# Patient Record
Sex: MALE | Race: WHITE | NOT HISPANIC OR LATINO | Employment: FULL TIME | URBAN - METROPOLITAN AREA
[De-identification: names, ages, dates, MRNs, and addresses within clinical notes are randomized per-mention and may not be internally consistent; named-entity substitution may affect disease eponyms.]

---

## 2019-05-27 ENCOUNTER — OFFICE VISIT (OUTPATIENT)
Dept: INTERNAL MEDICINE | Facility: CLINIC | Age: 44
End: 2019-05-27

## 2019-05-27 ENCOUNTER — CLINICAL SUPPORT (OUTPATIENT)
Dept: INTERNAL MEDICINE | Facility: CLINIC | Age: 44
End: 2019-05-27

## 2019-05-27 VITALS — SYSTOLIC BLOOD PRESSURE: 124 MMHG | HEART RATE: 80 BPM | DIASTOLIC BLOOD PRESSURE: 84 MMHG | WEIGHT: 183 LBS

## 2019-05-27 DIAGNOSIS — Z00.00 ROUTINE GENERAL MEDICAL EXAMINATION AT A HEALTH CARE FACILITY: Primary | ICD-10-CM

## 2019-05-27 DIAGNOSIS — Z00.00 ANNUAL PHYSICAL EXAM: Primary | ICD-10-CM

## 2019-05-27 LAB
ALBUMIN SERPL BCP-MCNC: 4.3 G/DL (ref 3.5–5.2)
ALP SERPL-CCNC: 64 U/L (ref 55–135)
ALT SERPL W/O P-5'-P-CCNC: 22 U/L (ref 10–44)
ANION GAP SERPL CALC-SCNC: 8 MMOL/L (ref 8–16)
AST SERPL-CCNC: 27 U/L (ref 10–40)
BILIRUB SERPL-MCNC: 1 MG/DL (ref 0.1–1)
BUN SERPL-MCNC: 14 MG/DL (ref 6–20)
CALCIUM SERPL-MCNC: 9.9 MG/DL (ref 8.7–10.5)
CHLORIDE SERPL-SCNC: 106 MMOL/L (ref 95–110)
CHOLEST SERPL-MCNC: 236 MG/DL (ref 120–199)
CHOLEST/HDLC SERPL: 3.2 {RATIO} (ref 2–5)
CO2 SERPL-SCNC: 25 MMOL/L (ref 23–29)
COMPLEXED PSA SERPL-MCNC: 0.41 NG/ML (ref 0–4)
CREAT SERPL-MCNC: 0.8 MG/DL (ref 0.5–1.4)
ERYTHROCYTE [DISTWIDTH] IN BLOOD BY AUTOMATED COUNT: 13 % (ref 11.5–14.5)
EST. GFR  (AFRICAN AMERICAN): >60 ML/MIN/1.73 M^2
EST. GFR  (NON AFRICAN AMERICAN): >60 ML/MIN/1.73 M^2
ESTIMATED AVG GLUCOSE: 103 MG/DL (ref 68–131)
GLUCOSE SERPL-MCNC: 95 MG/DL (ref 70–110)
HBA1C MFR BLD HPLC: 5.2 % (ref 4–5.6)
HCT VFR BLD AUTO: 44.8 % (ref 40–54)
HDLC SERPL-MCNC: 73 MG/DL (ref 40–75)
HDLC SERPL: 30.9 % (ref 20–50)
HGB BLD-MCNC: 14.9 G/DL (ref 14–18)
LDLC SERPL CALC-MCNC: 148.6 MG/DL (ref 63–159)
MCH RBC QN AUTO: 30.5 PG (ref 27–31)
MCHC RBC AUTO-ENTMCNC: 33.3 G/DL (ref 32–36)
MCV RBC AUTO: 92 FL (ref 82–98)
NONHDLC SERPL-MCNC: 163 MG/DL
PLATELET # BLD AUTO: 290 K/UL (ref 150–350)
PMV BLD AUTO: 9.8 FL (ref 9.2–12.9)
POTASSIUM SERPL-SCNC: 4.1 MMOL/L (ref 3.5–5.1)
PROT SERPL-MCNC: 7.4 G/DL (ref 6–8.4)
RBC # BLD AUTO: 4.89 M/UL (ref 4.6–6.2)
SODIUM SERPL-SCNC: 139 MMOL/L (ref 136–145)
TRIGL SERPL-MCNC: 72 MG/DL (ref 30–150)
TSH SERPL DL<=0.005 MIU/L-ACNC: 1.01 UIU/ML (ref 0.4–4)
WBC # BLD AUTO: 5.48 K/UL (ref 3.9–12.7)

## 2019-05-27 PROCEDURE — 85027 COMPLETE CBC AUTOMATED: CPT

## 2019-05-27 PROCEDURE — 99999 PR PBB SHADOW E&M-NEW PATIENT-LVL III: CPT | Mod: PBBFAC,,, | Performed by: INTERNAL MEDICINE

## 2019-05-27 PROCEDURE — 99203 OFFICE O/P NEW LOW 30 MIN: CPT | Mod: PBBFAC | Performed by: INTERNAL MEDICINE

## 2019-05-27 PROCEDURE — 99386 PR PREVENTIVE VISIT,NEW,40-64: ICD-10-PCS | Mod: S$PBB,,, | Performed by: INTERNAL MEDICINE

## 2019-05-27 PROCEDURE — 84153 ASSAY OF PSA TOTAL: CPT

## 2019-05-27 PROCEDURE — 84443 ASSAY THYROID STIM HORMONE: CPT

## 2019-05-27 PROCEDURE — 80061 LIPID PANEL: CPT

## 2019-05-27 PROCEDURE — 83036 HEMOGLOBIN GLYCOSYLATED A1C: CPT

## 2019-05-27 PROCEDURE — 99386 PREV VISIT NEW AGE 40-64: CPT | Mod: S$PBB,,, | Performed by: INTERNAL MEDICINE

## 2019-05-27 PROCEDURE — 80053 COMPREHEN METABOLIC PANEL: CPT

## 2019-05-27 PROCEDURE — 99999 PR PBB SHADOW E&M-NEW PATIENT-LVL III: ICD-10-PCS | Mod: PBBFAC,,, | Performed by: INTERNAL MEDICINE

## 2019-06-04 NOTE — PROGRESS NOTES
Subjective:       Patient ID: Riley Perez is a 44 y.o. male.    Chief Complaint: Executive Health    HPIDr Riley is a pleasant young gentleman originally from [place] here for his Executive Health exam. 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 He had no complaints stating that his health was good. He is active physically, exercises regularly, has maintained his weight and eats healthy for the most part.  I am sending copies of his blood work that showed mild increase in cholesterol level at 236 with excellent HDL fraction. All other parameters were within normal limits.    Review of Systems   All other systems reviewed and are negative.      Objective:      Physical Exam   Constitutional: He is oriented to person, place, and time. He appears well-developed and well-nourished. No distress.   HENT:   Head: Normocephalic and atraumatic.   Right Ear: External ear normal.   Left Ear: External ear normal.   Mouth/Throat: Oropharynx is clear and moist. No oropharyngeal exudate.   Eyes: Pupils are equal, round, and reactive to light. Conjunctivae and EOM are normal. Right eye exhibits no discharge. Left eye exhibits no discharge. No scleral icterus.   Neck: Normal range of motion. Neck supple. No JVD present. No thyromegaly present.   Cardiovascular: Normal rate, regular rhythm, normal heart sounds and intact distal pulses.   No murmur heard.  Pulmonary/Chest: Effort normal and breath sounds normal. No respiratory distress. He has no wheezes. He exhibits no tenderness.   Abdominal: Soft. Bowel sounds are normal. He exhibits no distension and no mass. There is no tenderness.   Musculoskeletal: Normal range of motion. He exhibits no edema or tenderness.   Lymphadenopathy:     He has no cervical adenopathy.   Neurological: He is alert and oriented to person, place, and time. No cranial nerve deficit. Coordination normal.   Skin: Skin is warm and dry. No rash noted. He is not  diaphoretic. No erythema.   Psychiatric: He has a normal mood and affect. His behavior is normal. Judgment and thought content normal.   Vitals reviewed.      Assessment:    1. Executive Health exam.  Plan:    1. Return to clinic in 1 year or sooner if needed.

## 2019-06-19 ENCOUNTER — OFFICE VISIT (OUTPATIENT)
Dept: OPTOMETRY | Facility: CLINIC | Age: 44
End: 2019-06-19

## 2019-06-19 DIAGNOSIS — H52.13 MYOPIA OF BOTH EYES: Primary | ICD-10-CM

## 2019-06-19 PROCEDURE — 99999 PR PBB SHADOW E&M-EST. PATIENT-LVL II: ICD-10-PCS | Mod: PBBFAC,,, | Performed by: OPTOMETRIST

## 2019-06-19 PROCEDURE — 92310 PR CONTACT LENS FITTING (NO CHANGE): ICD-10-PCS | Mod: ,,, | Performed by: OPTOMETRIST

## 2019-06-19 PROCEDURE — 99999 PR PBB SHADOW E&M-EST. PATIENT-LVL II: CPT | Mod: PBBFAC,,, | Performed by: OPTOMETRIST

## 2019-06-19 PROCEDURE — 99212 OFFICE O/P EST SF 10 MIN: CPT | Mod: PBBFAC | Performed by: OPTOMETRIST

## 2019-06-19 PROCEDURE — 92310 CONTACT LENS FITTING OU: CPT | Mod: ,,, | Performed by: OPTOMETRIST

## 2019-06-19 NOTE — PATIENT INSTRUCTIONS
Adult Vision:   41 to 60 Years of Age    If you are over 40 years of age, you've probably noticed changes in your vision. Difficulty seeing clearly for reading and close work is among the most common problems adults develop between ages 41 to 60. However, this is also the time when other changes in your eyes can start to affect your work and enjoyment of life.   Beginning in the early to mid-forties, most adults may start to experience problems with their ability to see clearly at close distances, especially for reading and computer tasks. This normal aging change in the eye's focusing ability, called presbyopia, will continue to progress over time.      Many people in middle age begin to experience difficulty with their vision.   Initially, you may find you need to hold reading materials farther away to see them clearly. Print in the newspaper or on a restaurant menu may appear blurred, especially under dim lighting. If you already wear prescription glasses or contact lenses to see clearly in the distance, the near vision changes caused by presbyopia can bring about the need to use bifocal or multifocal lenses. If you are nearsighted, you may have discovered that you now need to remove you glasses to see better up close. Fortunately, people with presbyopia now have many options to improve their ability to see well.   Along with the onset of presbyopia, an increase in the incidence of eye health problems occurs during these years. Whether or not there is a need for eyeglasses, adults should be examined for signs of developing eye and vision problems. A comprehensive eye examination is recommended at least every two years. Don't rely on an insufficient substitute like the limited 's license vision test or other vision screenings to determine if you have an eye or vision problem.  Adults over 40 may be particularly at risk for the development of eye and vision problems if any of the following exist:  Chronic,  systemic conditions such as diabetes or high blood pressure.   A family history of glaucoma or macular degeneration.   A highly visually demanding job or work in an eye-hazardous occupation.   Health conditions like high cholesterol, thyroid conditions, anxiety or depression, and arthritis for which you take medications. Many medications, even antihistamines, have ocular side-effects.    Understanding Age-related Vision Changes  Just like your body, your eyes and vision change over time. Aging changes in various parts of the eye can result in a number of noticeable differences in how well you see. While not everyone will experience the same level of symptoms, the following are common age-related vision changes:  Need for More Light  As you age, you need more light to see as well as you did in years past. Brighter lights in your work area or next to your reading chair will help make reading and other near tasks easier.    Difficulty Reading and Doing Close Work  Printed materials are not as clear as before, in part because the lens in your eye becomes less flexible with time. This makes it harder for your eyes to focus near objects with the same ability you had when you were younger.    Problems with Glare  You may notice additional glare from headlights at night or sun reflecting off of windshields or pavement during the day, making it more difficult to drive. Changes within the lens in your eye cause light entering the eye to be scattered rather than focused precisely on the retina, thus creating more glare.    Changes in Color Perception  The normally clear lens located inside your eye may start to discolor making it harder to see and distinguish between certain shades of colors.    Reduced Tear Production  With age, the tear glands in your eyes will produce fewer tears. This is particularly true for women after menopause. As a result, your eyes may feel dry and irritated. Having an adequate amount of tears is an  essential element in keeping your eyes healthy and maintaining clear sight.      Encountering Problems with Near Vision after 40  If you have enjoyed relatively good vision throughout your life and haven't needed eyeglasses or contact lenses to correct distance vision, then the development of near vision problems after age 40 can be somewhat of a concern and a frustration. Losing the ability to read the newspaper or see the cell phone numbers may seem to have occurred abruptly. Actually, these changes have been occurring gradually since childhood. But up until now, your eyes have had adequate focusing power to allow you to see clearly for reading and close work. Now your eyes no longer have enough focusing power for clear and comfortable near vision tasks.      Persons with presbyopia have several options available to regain clear near vision.   This loss of focusing ability for near vision, called presbyopia, is simply the result of the lens inside the eye becoming less flexible. This flexibility allows the eye to change focus from objects are far to objects that are close. Persons with presbyopia have several options available to regain clear near vision. They include:  Eyeglasses, including single vision reading glasses and multifocal lenses   Contact lenses, including monovision and bifocal lenses   Laser surgery and other refractive surgery procedures  As you continue to age through your 50s and beyond, presbyopia becomes more advanced. You may notice the need for more frequent changes in eyeglass or contact lens prescriptions. Around age 60, these changes in near vision should stop and prescription changes should occur less frequently.  Presbyopia can't be prevented or cured, but many options are available to help compensate for the loss of near focusing ability. Most individuals should be able to obtain clear, comfortable near vision for all of their lifestyle needs.       Warning Signs of Eye Health  Problems  This is also the time in life when your risk for developing a number of eye and vision problems increases. If you experience any of the following symptoms, you may have the early warning signs of a serious eye health problem:  Fluctuating Vision  If you experience frequent changes in how clearly you can see, it may be a sign of diabetes or hypertension (high blood pressure). These chronic conditions can damage the tiny blood vessels in the retina, the light sensitive layer at the back of the eye, causing vision loss that can sometimes be permanent.    Seeing Floaters and Flashes   Occasionally, you may see spots or floaters in your eyes. In most cases, these are actually shadowy images of particles floating in the fluid that fills the inside of the eye. Although they can be bothersome, spots and floaters are usually harmless and typically do not risk vision. They are a natural part of the eye's aging process. But if you suddenly see more floaters than normal, and they are accompanied by bright, flashing lights, they may be a warning sign of impending retinal detachment--a tear of the retina. This should be treated immediately to prevent serious loss of vision. (Link to Spots and Floaters)    Loss of Side Vision  If it seems that you are losing peripheral or side vision, this may be a sign of glaucoma. Glaucoma occurs when the optic nerve is damaged and no longer transmits all visual images to the brain. It often has no symptoms until damage to sections of your vision has begun. (Link to glaucoma)    Seeing distorted images   If straight lines appear distorted or wavy or there appears to be a blind spot or empty area in the center of your vision, you may have the signs of age-related macular degeneration (AMD). The disease affects the macula, the part of your retina that is responsible for central vision where the eye's acuity is sharpest. The disease causes a blind spot that's right in the middle of your  field of vision. (Link to age-related macular degeneration)   Regular eye examinations and early diagnosis and treatment of eye diseases can help you continue to preserve good vision throughout life.      Courtesy of The American Optometric Association    Open Your Eyes to Healthy Eating Habits  NUTRITION TIPS FOR YOUR EYE SIGHT  Doctors of optometry see millions of patients a year and are the primary providers of eye and vision care in [place]. This month, in celebration of national Save Your Vision Month, the American Optometric Association (AOA), [employer] and [place] are educating Americans on the many preventative actions they can take to protect their sight, including eating  right.    More than 43 million Americans suffer from cataracts or age-related macular degeneration (AMD), the two leading causes of vision loss and blindness.  Research indicates that there is a strong correlation between good nutrition and the prevention of these age-related eye diseases. Eating foods rich in key nutrients - antioxidants lutein and zeaxanthin, essential fatty acids, vitamins C and E and the mineral zinc - can help protect eyesight and vision.    Fast Facts   In a recent survey conducted by the AOA, nearly three-fourths (72%) of respondents  age 55 and older began noticing changes in their vision between the ages of 40  and 45.   To cope with vision loss or various eye problems, less than one-third (29%) of  respondents are increasing their nutrient intake for healthy eyes.   Many Americans (48%) still believe that carrots are the best food for eye health,  when, in fact, spinach and other dark leafy greens are the healthiest foods for  the eyes because they naturally contain large amounts of lutein and zeaxanthin.   In order to maintain healthy eyes, studies show that 10 mg of lutein should be  consumed each day or one cup of cooked spinach four times a week.   More than 50% of Americans do not take in  the recommended dosage of vitamin C  per day.   One cup (8 fl oz) of orange juice per day contains 81.6 mg/serving of vitamin C,  more than enough to help offset some eye diseases.  Visit www.AOA.org for additional information, or for vision-friendly recipes      Nutrition for Healthy Eyes  By Klaus Calvin OD  Research suggests that antioxidants and other important nutrients may reduce your risk of cataracts and macular degeneration. Specific antioxidants can have additional benefits as well; for example, vitamin A protects against blindness, and vitamin C may play a role in preventing or alleviating glaucoma.  Omega-3 essential fatty acids appear to help the eye in a variety of ways, from alleviating symptoms of dry eye syndrome to guarding against macular damage.  Eye Benefits of Vitamins and Micronutrients    A healthy diet for your eyes should include plenty of colorful fruits and vegetables.   The following vitamins, minerals and other nutrients have been shown to be essential for good vision and may protect your eyes from sight-robbing conditions and diseases.  Incorporating the following foods in your diet will help you get the Recommended Dietary Allowance (RDA) of these important eye nutrients. Established by the [place] [employer] (National Academy of Sciences), the RDA is the average daily dietary intake level of a nutrient sufficient to meet the requirements of nearly all healthy individuals in a specific life stage and gender group.  While the RDA is a useful reference, some eye care practitioners recommend higher daily intakes of certain nutrients for people at risk for eye problems.  (In the following list, mg = milligram; mcg = microgram (1/1000 of a mg) and IU = International Unit.)  Beta-carotene  Eye benefits of beta-carotene: When taken in combination with zinc and vitamins C and E, beta-carotene may reduce the progression of macular degeneration.   Food sources: Carrots, sweet potatoes,  spinach, kale, butternut squash.   RDA: None (most supplements contain 5,000 to 25,000 IU).  Bioflavonoids (Flavonoids)  Eye benefits of bioflavonoids: May protect against cataracts and macular degeneration.   Food sources: Tea, red wine, citrus fruits, bilberries, blueberries, cherries, legumes, soy products.   RDA: None.  More Info   Learn more about omega-3s and save $2 on TheraTears Nutrition gels   Moderate to severe dry eyes? Find out if Retaine MGD is right for you   Learn how Optometry Giving Sight helps 670 million people to see again  Lutein and Zeaxanthin  Eye benefits of lutein and zeaxanthin: May prevent cataracts and macular degeneration.   Food sources: Spinach, kale, turnip greens, awa greens, squash.   RDA: None.      This infographic shows which nutrients you need for good eye health as you age. Please click here for the full image. (Image: [employer])   Omega-3 Fatty Acids  Eye benefits of omega-3 fatty acids: May help prevent macular degeneration (AMD) and dry eyes.   Food sources: Cold-water fish such as salmon, mackerel and herring; fish oil supplements, freshly ground flaxseeds, walnuts.   RDA: None; but for cardiovascular benefits, the American Heart Association recommends approximately 1,000 mg daily.  Selenium  Eye benefits of selenium: When combined with carotenoids and vitamins C and E, may reduce risk of advanced AMD.   Food sources: Seafood (shrimp, crab, salmon, halibut), [place] nuts, enriched noodles, brown rice.   RDA: 55 mcg for teens and adults (60 mcg for women during pregnancy and 70 mcg when breast-feeding).  Vitamin A  Eye benefits of vitamin A: May protect against night blindness and dry eyes.   Food sources: Beef or chicken liver; eggs, butter, milk.   RDA: 3,000 IU for men; 2,333 IU for women (2,567 IU during pregnancy and 4,333 IU when breast-feeding).  Vitamin C  Eye benefits of vitamin C: May reduce the risk of cataracts and macular degeneration.   Food sources:  Sweet peppers (red or green), kale, strawberries, broccoli, oranges, cantaloupe.   RDA: 90 mg for men; 70 mg for women (85 mg during pregnancy and 120 mg when breast-feeding).  Vitamin D  Eye benefits of vitamin D: May reduce the risk of macular degeneration.   Food sources: [place], sardines, mackerel, milk; orange juice fortified with vitamin D.   RDA: None, but the American Academy of Pediatrics recommends 400 IU per day for infants, children and adolescents, and many experts recommend higher daily intakes for adults.   The best source of vitamin D is exposure to sunlight. Ultraviolet radiation from the sun stimulates production of vitamin D in human skin, and just a few minutes of exposure to sunlight each day (without sunscreen) will insure your body is producing adequate amounts of vitamin D.  Vitamin E  Eye benefits of vitamin E: When combined with carotenoids and vitamin C, may reduce the risk of advanced AMD.   Food sources: Almonds, sunflower seeds, hazelnuts.   RDA: 15 mg for teens and adults (15 mg for women during pregnancy and 19 mg when breast-feeding).  Zinc  Eye benefits of zinc: Helps vitamin A reduce the risk of night blindness; may play a role in reducing risk of advanced AMD.   Food sources: Oysters, beef, [place] crab, turkey (dark meat).   RDA: 11 mg for men; 8 mg for women (11 mg during pregnancy and 12 mg when breast-feeding).  In general, it's best to obtain most nutrients through a healthy diet, including at least two servings of fish per week and plenty of colorful fruits and vegetables.  If you plan to begin a regimen of eye vitamins, be sure to discuss this with your optometrist or ophthalmologist. Taking too much of certain vision supplements can cause problems, especially if you are taking prescription medications for health problems.  Bon appétit!   Home » Nutrition » Overview     About the Author: Klaus Calvin OD, is  of [employer]. Dr. Calvin has more than  "25 years of experience as an eye care provider, health educator and consultant to the eyewear industry. His special interests include contact lenses, nutrition and preventive vision care.      Eye Benefits of Omega-3 Fatty Acids  By Klaus Calvin, OD  You may find it hard to believe that fat is essential to your health, but it's true. Without fat, our bodies can't function properly. And without the proper kinds of fats in our diet, our eye health also may suffer.  Fatty acids are the "building blocks" of fat. These important nutrients are critical for the normal production and functioning of cells, muscles, nerves and organs. Fatty acids also are required for the production of hormone-like compounds that help regulate blood pressure, heart rate and blood clotting.  Some fatty acids -- called essential fatty acids (EFAs) -- are necessary to our diet, because our body can't produce them. To stay healthy, we must obtain these fatty acids from our food.  Two types of EFAs are omega-3 fatty acids and omega-6 fatty acids. Studies have found that omega-3 fatty acids, in particular, may benefit eye health.  Omega-3 fatty acids include docosahexaenoic acid (DHA), eicoapentaenoic acid (EPA) and alpha-linolenic acid (ALA).  Omega-3 Fatty Acids and Infant Vision Development  A number of clinical studies have shown omega-3 fatty acids are essential for normal infant vision development.    Grilled salmon is an excellent natural source of omega-3 fatty acids.   DHA and other omega-3 fatty acids are found in maternal breast milk and also are added to some supplemented infant formulas. Omega-3 supplemental formulas appear to stimulate vision development in infants.  According to an analysis of several studies conducted by researchers at [employer] and published in the journal Pediatrics, the authors found that healthy pre-term infants who were fed DHA-supplemented formula showed significantly better visual acuity " "at 2 and 4 months of age, compared with similar pre-term infants who were fed formula that did not contain the omega-3 supplement.  Adequate amounts of DHA and other omega-3 fatty acids in the diet of pregnant women also appear to be important in normal infant vision development.  More Info   Learn more about omega-3s and save $2 on TheraTears Nutrition gels   Moderate to severe dry eyes? Find out if Retaine MGD is right for you   Learn how Optometry Giving Sight helps 670 million people to see again  In a study published in the American Journal of Clinical Nutrition, [place] researchers found that infant girls whose mothers received DHA supplements from their fourth month of pregnancy until delivery were less likely to have below-average visual acuity at 2 months of age than infant girls whose mothers did not receive the omega-3 supplements.  Adult Eye Benefits of Omega-3 Fatty Acids  Several studies suggest omega-3 fatty acids may help protect adult eyes from macular degeneration and dry eye syndrome. Essential fatty acids also may help proper drainage of intraocular fluid from the eye, decreasing the risk of high eye pressure and glaucoma.  In a large  study published in 2008, participants who ate oily fish (an excellent source of DHA and EPA omega-3 fatty acids) at least once per week had half the risk of developing neovascular ("wet") macular degeneration, compared with those who ate fish less than once per week.    Eye Nutrition News   Omega-3 Supplements Relieve Dry Eye Symptoms Among Computer Users, Study Finds  February 2015 -- Taking daily omega-3 fatty acid supplements could help relieve your dry eyes associated with computer use, according to a study.    The study participants were 456 computer users in [facility] who complained of dry eyes and who used a computer for more than three hours a day for at least one year.  Subjects in one group (220) were given two capsules of omega-3 fatty acids, each " containing 180mg EPA and 120mg DHA, to supplement their daily diet; subjects in the other group (236) were given two capsules of a placebo containing olive oil for daily use. Each group took the daily supplements for three months.  At the end of the three-month trial, a survey of the participants revealed dry eye symptoms diminished after dietary intervention with omega-3 fatty acids, and use of the omega-3 supplements also reduced abnormal tear evaporation. The omega-3 supplements also increased the density of conjunctival goblet cells on the surface of the eye. These cells secrete substances that lubricate the eye during blinks, stabilize the tear film and reduce dryness.  The study authors concluded that orally administered omega-3 fatty acid supplements can alleviate dry eye symptoms, slow tear evaporation, and improve signs of a healthy eye surface in patients suffering from dry eyes related to computer vision syndrome.  A report of this study was published online this month by the journal Contact Lens & Anterior Eye. -- G.H.  Also, a 2009 National [facility] [place] (NEI) study that used data obtained from the Age-Related Eye Disease Study (AREDS) found participants who reported the highest level of omega-3 fatty acids in their diet were 30 percent less likely than their peers to develop macular degeneration during a 12-year period.  In May 2013, the NEI published results of a large follow-up to the original AREDS study called AREDS2. Among other things, AREDS2 investigated whether daily supplementation of omega-3 fatty acids, along with the original AREDS nutritional supplement or modifications of that formula -- which contained beta-carotene, vitamin C, vitamin E, zinc and copper -- would further reduce the risk of AMD progression among study participants with early signs of macular degeneration. (The original AREDS supplement reduced the risk of AMD progression by 25 percent among a similar population.)  A  somewhat surprising result of AREDS2 was that participants who supplemented their diet with 1,000 mg of omega-3s daily (350 mg DHA and 650 mg EPA) did not show any reduction of their risk for progressive AMD over the 5-year duration of the study, compared with participants who did not receive omega-3 supplements.  Possible explanations for these different findings from AREDS and AREDS2 data may be that omega-3 fatty acids are more effective at reducing the risk of age-related eye diseases when obtained via food sources rather than from nutritional supplements, and that a healthy diet containing plenty of omega-3s along with other important nutrients consumed over a person's lifetime is more protective than taking nutritional supplements for a 5-year period.  Omega-3 fatty acids also have been found to reduce the risk of dry eyes. In a study of more than 32,000 women between the ages of 45 and 84, those with the highest ratio of (potentially harmful) omega-6 fatty acids to beneficial omega-3 fatty acids in their diet (15-to-1) had a significantly greater risk of dry eye syndrome, compared with the women with the lowest ratio (less than 4-to-1). The study also found that the women who ate at least two servings of tuna per week had significantly less risk of dry eye than women who ate one or fewer servings per week.  Omega-3 fatty acids also may help treat dry eyes. In a recent study of dry eyes induced in mice, topical application of the omega-3 fatty acid ALA led to a significant decrease in dry eye signs and inflammation associated with dry eye.  [place]  While both omega-3 and omega-6 fatty acids are important to health, the balance of these two types of EFAs in our diet is extremely important. Most experts believe the ratio of omega-6 to omega-3 fatty acids in a healthy diet should be 4-to-1 or lower.  Many eye doctors recommend a diet high in omega-3 fatty acids to reduce the risk of eye problems.  "  Unfortunately, the typical American diet, characterized by significant amounts of meat and processed foods, tends to contain 10 to 30 times more omega-6 than omega-3 fatty acids. This imbalance of omega-6 ("bad") fatty acids to omega-3 ("good") fatty acids appears to be a contributing cause of a number of serious health problems, including heart disease, cancer, asthma, arthritis and depression.  One of the best steps you can take to improve your diet is to eat more foods that are rich in omega-3 fatty acids and fewer that are high in omega-6 fatty acids.  The best food sources of beneficial omega-3 fatty acids are cold-water fish, which are high in both DHA and EPA. Examples include sardines, herring, salmon and tuna. Wild-caught varieties usually are better than "farmed" fish, which typically are subject to higher levels of pollutants and chemicals.  The American Heart Association recommends a minimum of two servings of cold-water fish weekly to reduce the risk of cardiovascular disease, and many eye doctors likewise recommend a diet high in omega-3 fatty acids to reduce the risk of eye problems.  If you aren't a fish lover, another way to make sure your diet contains enough omega-3s it to take fish oil supplements. These are available in capsule and liquid form, and many varieties feature a "non-fishy" taste.  Other good sources of omega-3 fatty acids include flaxseeds, flaxseed oil, walnuts and dark green leafy vegetables. However, your body cannot process the ALA omega-3 fatty acids from these vegetarian sources as easily as the DHA and EPA omega-3 fatty acids found in fish.  To reduce your intake of omega-6s, avoid fried and highly processed foods. Many cooking oils, including sunflower oil and corn oil, are very high in omega-6 fatty acids. High cooking temperatures also create harmful trans-fatty acids, or "trans-fats."  Trans fats interfere with the body's absorption of beneficial omega-3 fatty acids and " may contribute to a number of serious diseases, including cancer, heart disease, atherosclerosis (hardening of the arteries), high blood pressure, diabetes, obesity, arthritis and immune system disorders.  Currently, there is no Recommended Dietary Allowance (RDA) for omega-3 fatty acids. But, according to the American Heart Association, research suggests daily intakes of DHA and EPA (combined) ranging from 500 milligrams (0.5 gram) to 1.8 grams (either from fish or fish oil supplements) significantly reduces cardiac risks. For ALA, daily intakes of 1.5 to 3 grams (g) seem to be beneficial.  Foods Containing Omega-3 Essential Fatty Acids   Food DHA and EPA Omega-3s (total), grams   [place], Atlantic (half fillet, grilled) 3.89   Mackerel, Pacific (1 fillet, grilled) 3.25   Sardine oil (1 tablespoon) 2.83   [place], [place] (half fillet, grilled) 2.68   Cod liver oil (1 tablespoon) 2.43   [place], pink (half fillet, grilled)  1.60   Herring oil (1 tablespoon) 1.43   Sardines, canned in oil (approx. 3 ounces) 0.90   White tuna, canned in water (approx. 3 ounces) 0.73   Source: [place] [employer], U.[place]. Dept. of Agriculture   For a more nutritious diet and potentially better eye health, try these simple changes:  1. Replace cooking oils that are high in omega-6 fatty acids with olive oil, which has significantly lower levels of omega-6 fatty acids.  2. Eat plenty of fish, fruits and vegetables.  3. Avoid hydrogenated oils (found in many snack foods) and margarine.  4. Avoid fried foods and foods containing trans fats.  5. Limit your consumption of red meat.  Choosing a healthy diet that includes a variety of foods with plenty of omega-3 fatty acids and limiting your intake of potentially harmful omega-6 fatty acids will significantly increase your odds of a lifetime of good vision and vibrant health.   Home » Nutrition » Omega-3 Fatty Acids     About the Author: Klaus Calvin OD, is  of  [employer]. Dr. Calvin has more than 25 years of experience as an eye care provider, health educator and consultant to the eyewear industry. His special interests include contact lenses, nutrition and preventive vision care      Protecting Your Eyes from Solar Radiation  The sun supports all life on our planet, but its life-giving rays also pose dangers.  The suns primary danger is in the form of Ultraviolet (UV) radiation. UV radiation is a component of solar radiation, but it can also be given off by artificial sources like welding machines, tanning beds and lasers.  Most are aware of the harm UV radiation can do to the skin, but many may not realize that exposure to UV radiation can harm the eyes or that other components of solar radiation can also affect vision.  There are three types of UV radiation: UV-C is absorbed by the ozone layer and does not present any threat; UV-A and UV-B radiation can have adverse long- and short-term effects on the eyes and vision.  If your eyes are exposed to excessive amounts of UV radiation over a short period of time, you are likely to experience an effect called photokeratitis.    Like a sunburn of the eye, photokeratitis may be painful and include symptoms such as red eyes, a foreign body sensation or gritty feeling in the eyes, extreme sensitivity to light and excessive tearing. Fortunately, this is usually temporary and rarely causes permanent damage to the eyes.  Long-term exposure to UV radiation, however, can be more serious. Scientific studies and research have shown that exposure to small amounts of UV radiation over a period of many years increases the chance of developing a cataract and may cause damage to the retina, a nerve-rich lining of the eye that is used for seeing. Additionally, chronic exposure to shorter wavelength visible light (i.e. blue and violet light) may also be harmful to the retina.  The longer the eyes are exposed to solar radiation,  the greater the risk of developing later in life such conditions as cataracts or macular degeneration. Since it is not clear how much exposure to solar radiation will cause damage, the AOA recommends wearing quality sunglasses that offer UV protection and wearing a hat or cap with a wide brim whenever you spend time outdoors.    To provide adequate protection for your eyes, sunglasses should:  block out 99 to 100 percent of both UV-A and UV-B radiation;   screen out 75 to 90 percent of visible light;   be perfectly matched in color and free of distortion and imperfection; and   have lenses that are gray for proper color recognition.  The lenses in sunglasses should be made from polycarbonate or Trivex® material if you participate in potentially eye-hazardous work or sports. These lenses provide the most impact resistance.  If you spend a lot of time outdoors in bright sunlight, wrap around frames can provide additional protection from the harmful solar radiation.  Dont forget protection for children and teenagers. They typically spend more time in the sun than adults.

## 2019-06-19 NOTE — PROGRESS NOTES
ANTONELLA Perez is a 44 y.o. male who comes in  to establish eye care.  His   last eye exam was about 2 years ago.  He reports to have low myopia for   which he wears glasses, as needed for distance only. He explains that he    has not noticed any new or concerning ocular or visual symptoms. Of note,   there is a family history of Myasthenia Gravis (father)    (--)blurred vision  (--)Headaches  (--)diplopia  (--)flashes  (--)floaters  (--)pain  (--)Itching  (--)tearing  (--)burning  (--)Dryness  (--) OTC Drops  (--)Photophobia      Last edited by Sofía Taylor, OD on 6/19/2019  5:45 PM. (History)        Review of Systems   Constitutional: Negative for chills, fever and malaise/fatigue.   HENT: Negative for congestion and hearing loss.    Eyes: Negative for blurred vision, double vision, photophobia, pain, discharge and redness.   Respiratory: Negative.    Cardiovascular: Negative.    Gastrointestinal: Negative.    Genitourinary: Negative.    Musculoskeletal: Negative.    Skin: Negative.    Neurological: Negative for seizures.   Endo/Heme/Allergies: Negative for environmental allergies.   Psychiatric/Behavioral: Negative.        Base Eye Exam     Visual Acuity (Snellen - Linear)       Right Left    Dist cc 20/20 -1 20/25 +3    Correction:  Glasses          Tonometry (Applanation, 4:27 PM)       Right Left    Pressure 12 12          Pupils       Pupils Dark Light Shape React APD    Right PERRL 5 3 Round Brisk None    Left PERRL 5 3 Round Brisk None          Visual Fields       Right Left     Full Full          Extraocular Movement       Right Left     Full Full          Neuro/Psych     Oriented x3:  Yes    Mood/Affect:  Normal          Dilation     Both eyes:  0.5% Mydriacyl @ 4:31 PM            Slit Lamp and Fundus Exam     External Exam       Right Left    External Normal Normal          Slit Lamp Exam       Right Left    Lids/Lashes Normal Normal    Conjunctiva/Sclera White and quiet White and quiet    Cornea  Clear Clear    Anterior Chamber Deep and quiet Deep and quiet    Iris Round and reactive Round and reactive    Lens Clear Clear          Fundus Exam       Right Left    Vitreous Normal Normal    Disc Normal, No pallor, no edema Normal, No pallor, no edema    C/D Ratio 0.2 0.2    Macula Normal, No exudates, no hemes Normal, No exudates, no hemes    Vessels Normal, no AV changes, no hemes, normal course and caliber Normal, no AV changes, no hemes, normal course and caliber    Periphery Normal, Flat & intact 360°, no holes, tears, RD Normal, Flat & intact 360°, no holes, tears, RD            Refraction     Wearing Rx       Sphere Cylinder Axis    Right -1.00 +0.25 130    Left -1.00 +0.50 045    Age:  3yrs    Type:  SVL          Cycloplegic Refraction       Sphere Cylinder Dist VA    Right -1.00 Sphere 20/20    Left -1.00 Sphere 20/20          Final Rx       Sphere Cylinder Dist VA    Right -1.00 Sphere 20/20    Left -1.00 Sphere 20/20    Type:  [facility]    Expiration Date:  6/19/2020                Assessment /Plan     1. Myopia of both eyes with early presbyopia  - Spec Rx per final Rx below for distance only (same specs ok)  Glasses Prescription (6/19/2019)        Sphere Cylinder Dist VA    Right -1.00 Sphere 20/20    Left -1.00 Sphere 20/20    Type:  SVL    Expiration Date:  6/19/2020        2. Good ocular health    Patient education; RTC in 2 years, sooner as needed  At [facility]

## 2020-04-21 DIAGNOSIS — Z01.84 ANTIBODY RESPONSE EXAMINATION: ICD-10-CM

## 2020-04-22 ENCOUNTER — LAB VISIT (OUTPATIENT)
Dept: LAB | Facility: HOSPITAL | Age: 45
End: 2020-04-22
Attending: INTERNAL MEDICINE
Payer: COMMERCIAL

## 2020-04-22 DIAGNOSIS — Z01.84 ANTIBODY RESPONSE EXAMINATION: ICD-10-CM

## 2020-04-22 LAB — SARS-COV-2 IGG SERPL QL IA: NEGATIVE

## 2020-04-22 PROCEDURE — 86769 SARS-COV-2 COVID-19 ANTIBODY: CPT

## 2020-04-22 PROCEDURE — 36415 COLL VENOUS BLD VENIPUNCTURE: CPT

## 2020-07-28 DIAGNOSIS — Z00.6 RESEARCH STUDY PATIENT: Primary | ICD-10-CM

## 2020-08-03 NOTE — PROGRESS NOTES
..Date of Consent: 08/03/2020    Sponsor: [facility]       Study Title/IRB Number: Safety of childcare services in the context of COVID-19 pandemic: Lessons from [facility] approved childcare centers,  2020.230    Principle Investigators:    Paulino Wright, AGUSTINA, MS, PhD, KARLI, FAPATRIZIA, EDIE Tate MD (Billy), MMM, FAAP, FAAEM     Did the patient need translation services?  no     name:  na      CONSENT FOR QUESTIONNAIRE PORTION:    Prior to the Informed Consent (IC) being given and documented, or any study protocol required data collection, testing, procedure, or intervention being performed, the following were done and/or discussed:    Patient was given a paper copy of the IC for review in the invitation e-mail.  A copy was also made available to download in the online questionnaire.  Patient was provided a study FAQ, as part of the Childcare Invitation Letter.  Purpose of the study and qualifications to participate were discussed in the IC and Invitation letter.   Study design and tests or procedures to be completed were discussed in the online IC and Invitation letter.   Confidentiality and HIPAA Authorization for Release of Medical Records for the research trial/subject's rights/research related injury were discussed in the IC.  Risk, Benefits, Alternative Treatments, Compensation and Costs were discussed in the IC.  Participation in the research trial is voluntary and patient may withdraw at anytime.    Contact information for study related questions is listed in the IC. Contact information for PI (page 1 of IC) and IRB (page 7 of IC) was given to patient. Patient was also given the contact information of the  Mikal Delgado    Patient able to adequately summarize the purpose of the study, the risks associated with the study, and all procedures, testing, and follow-ups associated with the study: Patient was given ample time to ask any questions. All questions  were answered and patient verbalizes understanding of the above.         CONSENT FOR BLOOD DRAW PORTION OF STUDY:    The consent was discussed verbally with the patient and all questions were answered satisfactorily. Patient gave verbal consent for the [facility] Safety research study with an IRB approval date of 07/07/2020. No study procedures were completed before IC process was completed.     The Consent and name of Clinical Research Coordinator consenting was captured and documented in REDCap.     All Inclusion and Exclusion Criteria reviewed, subject meets all Inclusion criteria and does not meet any Exclusion Criteria at this time.     Patient eligibility was confirmed.     Patient responded to survey questions.     The following biospecimen collection procedures were discussed:     Blood collection:  Gold tube for SARS-COV-2 IgG Antibody testing and green tube to store in the Biobank for future COVID-19 related research.    Patient was then entered into the study via [employer]. Orders were placed for the SARS-Covid-19 Antibody test (gold top tube) and a drug study send out order was placed for the green top tube (Biobank). Patient was scheduled for a lab appointment and lab orders were linked to the appointment. Lab appointment was linked to the study. Patient was accompanied to the lab for their blood draw.     After the lab draw was completed, patient was thanked for their participation and reminded results of the SARS-Covid-19 Antibody test would be sent to them or available on [employer].     ,Mikal Delgado was present for this visit.

## 2020-08-04 ENCOUNTER — TELEPHONE (OUTPATIENT)
Dept: PEDIATRIC GASTROENTEROLOGY | Facility: CLINIC | Age: 45
End: 2020-08-04

## 2020-08-04 DIAGNOSIS — Z00.6 RESEARCH STUDY PATIENT: Primary | ICD-10-CM

## 2020-08-04 NOTE — TELEPHONE ENCOUNTER
Incoming call from Eva in lab. She is unable to check in the drug study sendout order.  Order updated for Drug Study Sendout for 2020.230 Safety of Childcare Service Study.

## 2020-08-06 ENCOUNTER — LAB VISIT (OUTPATIENT)
Dept: LAB | Facility: HOSPITAL | Age: 45
End: 2020-08-06
Attending: PEDIATRICS
Payer: COMMERCIAL

## 2020-08-06 DIAGNOSIS — Z00.6 RESEARCH STUDY PATIENT: ICD-10-CM

## 2020-08-06 LAB
DRUG STUDY SPECIMEN TYPE: NORMAL
DRUG STUDY TEST NAME: NORMAL
DRUG STUDY TEST RESULT: NORMAL

## 2020-08-06 PROCEDURE — 36415 COLL VENOUS BLD VENIPUNCTURE: CPT

## 2020-08-06 PROCEDURE — 99000 SPECIMEN HANDLING OFFICE-LAB: CPT

## 2020-08-06 NOTE — TELEPHONE ENCOUNTER
Spoke with Eva in lab. The order was canceled by lab because the specimen was not collected.  Order re-entered.

## 2020-09-04 ENCOUNTER — OFFICE VISIT (OUTPATIENT)
Dept: INTERNAL MEDICINE | Facility: CLINIC | Age: 45
End: 2020-09-04
Payer: COMMERCIAL

## 2020-09-04 ENCOUNTER — CLINICAL SUPPORT (OUTPATIENT)
Dept: INTERNAL MEDICINE | Facility: CLINIC | Age: 45
End: 2020-09-04
Payer: COMMERCIAL

## 2020-09-04 DIAGNOSIS — Z00.00 ANNUAL PHYSICAL EXAM: Primary | ICD-10-CM

## 2020-09-04 DIAGNOSIS — Z00.00 ROUTINE GENERAL MEDICAL EXAMINATION AT A HEALTH CARE FACILITY: Primary | ICD-10-CM

## 2020-09-04 LAB
ALBUMIN SERPL BCP-MCNC: 4.5 G/DL (ref 3.5–5.2)
ALP SERPL-CCNC: 64 U/L (ref 55–135)
ALT SERPL W/O P-5'-P-CCNC: 13 U/L (ref 10–44)
ANION GAP SERPL CALC-SCNC: 5 MMOL/L (ref 8–16)
AST SERPL-CCNC: 17 U/L (ref 10–40)
BILIRUB SERPL-MCNC: 0.6 MG/DL (ref 0.1–1)
BUN SERPL-MCNC: 11 MG/DL (ref 6–20)
CALCIUM SERPL-MCNC: 9.5 MG/DL (ref 8.7–10.5)
CHLORIDE SERPL-SCNC: 105 MMOL/L (ref 95–110)
CHOLEST SERPL-MCNC: 197 MG/DL (ref 120–199)
CHOLEST/HDLC SERPL: 3.1 {RATIO} (ref 2–5)
CO2 SERPL-SCNC: 30 MMOL/L (ref 23–29)
COMPLEXED PSA SERPL-MCNC: 0.46 NG/ML (ref 0–4)
CREAT SERPL-MCNC: 0.8 MG/DL (ref 0.5–1.4)
ERYTHROCYTE [DISTWIDTH] IN BLOOD BY AUTOMATED COUNT: 12.5 % (ref 11.5–14.5)
EST. GFR  (AFRICAN AMERICAN): >60 ML/MIN/1.73 M^2
EST. GFR  (NON AFRICAN AMERICAN): >60 ML/MIN/1.73 M^2
ESTIMATED AVG GLUCOSE: 100 MG/DL (ref 68–131)
GLUCOSE SERPL-MCNC: 97 MG/DL (ref 70–110)
HBA1C MFR BLD HPLC: 5.1 % (ref 4–5.6)
HCT VFR BLD AUTO: 46.4 % (ref 40–54)
HDLC SERPL-MCNC: 64 MG/DL (ref 40–75)
HDLC SERPL: 32.5 % (ref 20–50)
HGB BLD-MCNC: 15.1 G/DL (ref 14–18)
LDLC SERPL CALC-MCNC: 115.6 MG/DL (ref 63–159)
MCH RBC QN AUTO: 30.5 PG (ref 27–31)
MCHC RBC AUTO-ENTMCNC: 32.5 G/DL (ref 32–36)
MCV RBC AUTO: 94 FL (ref 82–98)
NONHDLC SERPL-MCNC: 133 MG/DL
PLATELET # BLD AUTO: 287 K/UL (ref 150–350)
PMV BLD AUTO: 10 FL (ref 9.2–12.9)
POTASSIUM SERPL-SCNC: 4.7 MMOL/L (ref 3.5–5.1)
PROT SERPL-MCNC: 7.6 G/DL (ref 6–8.4)
RBC # BLD AUTO: 4.95 M/UL (ref 4.6–6.2)
SODIUM SERPL-SCNC: 140 MMOL/L (ref 136–145)
TRIGL SERPL-MCNC: 87 MG/DL (ref 30–150)
TSH SERPL DL<=0.005 MIU/L-ACNC: 1.21 UIU/ML (ref 0.4–4)
WBC # BLD AUTO: 5.51 K/UL (ref 3.9–12.7)

## 2020-09-04 PROCEDURE — 84153 ASSAY OF PSA TOTAL: CPT

## 2020-09-04 PROCEDURE — 99999 PR PBB SHADOW E&M-EST. PATIENT-LVL II: CPT | Mod: PBBFAC,,, | Performed by: INTERNAL MEDICINE

## 2020-09-04 PROCEDURE — 85027 COMPLETE CBC AUTOMATED: CPT

## 2020-09-04 PROCEDURE — 99386 PREV VISIT NEW AGE 40-64: CPT | Mod: S$GLB,,, | Performed by: INTERNAL MEDICINE

## 2020-09-04 PROCEDURE — 99999 PR PBB SHADOW E&M-EST. PATIENT-LVL II: ICD-10-PCS | Mod: PBBFAC,,, | Performed by: INTERNAL MEDICINE

## 2020-09-04 PROCEDURE — 99999 PR PBB SHADOW E&M-EST. PATIENT-LVL I: ICD-10-PCS | Mod: PBBFAC,,,

## 2020-09-04 PROCEDURE — 80061 LIPID PANEL: CPT

## 2020-09-04 PROCEDURE — 84443 ASSAY THYROID STIM HORMONE: CPT

## 2020-09-04 PROCEDURE — 99999 PR PBB SHADOW E&M-EST. PATIENT-LVL I: CPT | Mod: PBBFAC,,,

## 2020-09-04 PROCEDURE — 83036 HEMOGLOBIN GLYCOSYLATED A1C: CPT

## 2020-09-04 PROCEDURE — 80053 COMPREHEN METABOLIC PANEL: CPT

## 2020-09-04 PROCEDURE — 99386 PR PREVENTIVE VISIT,NEW,40-64: ICD-10-PCS | Mod: S$GLB,,, | Performed by: INTERNAL MEDICINE

## 2020-09-14 VITALS — DIASTOLIC BLOOD PRESSURE: 74 MMHG | HEART RATE: 62 BPM | SYSTOLIC BLOOD PRESSURE: 112 MMHG

## 2020-09-14 NOTE — PROGRESS NOTES
Subjective:       Patient ID: Riley Perez is a 45 y.o. male.    Chief Complaint: Executive Health    HPIDR Riley is here today for his Executive Health exam. Overall doing well and had no specific health concerns. He is active physically and has maintained his activities at work and home in spite of the difficult times that have been an issue for all. He denies any COVID exposures that he is aware of and has maintained the social distancing guidelines as indicated. He and his family have been well in that regard.  I am sending copies of his studies including blood work that was all within normal limits including CMP, CBC, lipids, TSH and others.  Review of Systems   Constitutional: Negative for activity change, appetite change, chills, fatigue, fever and unexpected weight change.   Respiratory: Negative for cough and shortness of breath.    Cardiovascular: Negative for chest pain, palpitations and leg swelling.   Gastrointestinal: Negative for abdominal distention, abdominal pain, blood in stool, change in bowel habit and change in bowel habit.   Genitourinary: Negative for difficulty urinating.   Musculoskeletal: Negative for arthralgias, back pain and joint swelling.   Neurological: Negative for dizziness, weakness, light-headedness and headaches.   Hematological: Negative.          Objective:      Physical Exam  Vitals signs and nursing note reviewed.   Constitutional:       General: He is not in acute distress.     Appearance: Normal appearance. He is normal weight.   HENT:      Head: Normocephalic and atraumatic.      Right Ear: Tympanic membrane, ear canal and external ear normal. There is no impacted cerumen.      Left Ear: Tympanic membrane, ear canal and external ear normal. There is no impacted cerumen.   Eyes:      General: No scleral icterus.        Right eye: No discharge.         Left eye: No discharge.      Extraocular Movements: Extraocular movements intact.      Conjunctiva/sclera: Conjunctivae  normal.      Pupils: Pupils are equal, round, and reactive to light.   Neck:      Musculoskeletal: Normal range of motion and neck supple. No neck rigidity or muscular tenderness.   Cardiovascular:      Rate and Rhythm: Normal rate.      Pulses: Normal pulses.      Heart sounds: Normal heart sounds. No murmur.   Pulmonary:      Effort: Pulmonary effort is normal. No respiratory distress.      Breath sounds: Normal breath sounds. No wheezing.   Chest:      Chest wall: No tenderness.   Abdominal:      General: Abdomen is flat. Bowel sounds are normal. There is no distension.      Palpations: Abdomen is soft. There is no mass.      Tenderness: There is no abdominal tenderness.   Musculoskeletal: Normal range of motion.         General: No swelling or tenderness.      Right lower leg: No edema.      Left lower leg: No edema.   Lymphadenopathy:      Cervical: No cervical adenopathy.   Skin:     General: Skin is warm and dry.      Findings: No erythema, lesion or rash.   Neurological:      General: No focal deficit present.      Mental Status: He is alert and oriented to person, place, and time.      Cranial Nerves: No cranial nerve deficit.      Motor: No weakness.      Coordination: Coordination normal.      Gait: Gait normal.   Psychiatric:         Mood and Affect: Mood normal.         Behavior: Behavior normal.         Thought Content: Thought content normal.         Judgment: Judgment normal.         Assessment:       1. Routine general medical examination at a health care facility        Plan:    1. Return to clinic in 1 year or sooner if needed.

## 2020-11-05 ENCOUNTER — OFFICE VISIT (OUTPATIENT)
Dept: OPTOMETRY | Facility: CLINIC | Age: 45
End: 2020-11-05
Payer: COMMERCIAL

## 2020-11-05 DIAGNOSIS — H52.13 MYOPIA OF BOTH EYES: ICD-10-CM

## 2020-11-05 DIAGNOSIS — H25.13 NUCLEAR SCLEROSIS, BILATERAL: Primary | ICD-10-CM

## 2020-11-05 PROCEDURE — 99999 PR PBB SHADOW E&M-EST. PATIENT-LVL II: ICD-10-PCS | Mod: PBBFAC,,, | Performed by: OPTOMETRIST

## 2020-11-05 PROCEDURE — 92014 PR EYE EXAM, EST PATIENT,COMPREHESV: ICD-10-PCS | Mod: S$GLB,,, | Performed by: OPTOMETRIST

## 2020-11-05 PROCEDURE — 99999 PR PBB SHADOW E&M-EST. PATIENT-LVL II: CPT | Mod: PBBFAC,,, | Performed by: OPTOMETRIST

## 2020-11-05 PROCEDURE — 92014 COMPRE OPH EXAM EST PT 1/>: CPT | Mod: S$GLB,,, | Performed by: OPTOMETRIST

## 2020-11-05 NOTE — PROGRESS NOTES
ANTONELLA Perez is a 45 y.o. male who return for continued eye care. His   initial exam with me was on 6/19/19.  He has bilateral myopia for which   glasses are prescribed for distance use as needed. Today, he reports that   he is doing well with his current glasses. He has not noticed any new or   concerning ocular or visual symptoms.    (--)blurred vision  (--)Headaches  (--)diplopia  (--)flashes  (--)floaters  (--)pain  (--)Itching  (--)tearing  (--)burning  (--)Dryness  (--) OTC Drops  (--)Photophobia      Last edited by Sofía Taylor, OD on 11/5/2020  5:54 PM. (History)        Review of Systems   Constitutional: Negative for chills, fever and malaise/fatigue.   HENT: Negative for congestion and hearing loss.    Eyes: Negative for blurred vision, double vision, photophobia, pain, discharge and redness.   Respiratory: Negative.    Cardiovascular: Negative.    Gastrointestinal: Negative.    Genitourinary: Negative.    Musculoskeletal: Negative.    Skin: Negative.    Neurological: Negative for seizures.   Endo/Heme/Allergies: Negative for environmental allergies.   Psychiatric/Behavioral: Negative.        For exam results, see encounter report    Assessment /Plan     1. Nuclear sclerosis, bilateral  - not visually significant  - Advised on UV protection when outside    2. Myopia of both eyes --> stable  - Spec Rx per final Rx below  - No presbyopic symptoms      Parent education; RTC in 1 year with DFE; Ok to instill 0.5% Tropicamide after (normal) baseline workup, sooner as needed

## 2020-12-17 ENCOUNTER — IMMUNIZATION (OUTPATIENT)
Dept: INTERNAL MEDICINE | Facility: CLINIC | Age: 45
End: 2020-12-17
Payer: COMMERCIAL

## 2020-12-17 DIAGNOSIS — Z23 NEED FOR VACCINATION: ICD-10-CM

## 2020-12-17 PROCEDURE — 0001A COVID-19, MRNA, LNP-S, PF, 30 MCG/0.3 ML DOSE VACCINE: CPT | Mod: CV19,,, | Performed by: INTERNAL MEDICINE

## 2020-12-17 PROCEDURE — 0001A COVID-19, MRNA, LNP-S, PF, 30 MCG/0.3 ML DOSE VACCINE: ICD-10-PCS | Mod: CV19,,, | Performed by: INTERNAL MEDICINE

## 2020-12-17 PROCEDURE — 91300 COVID-19, MRNA, LNP-S, PF, 30 MCG/0.3 ML DOSE VACCINE: ICD-10-PCS | Mod: ,,, | Performed by: INTERNAL MEDICINE

## 2020-12-17 PROCEDURE — 91300 COVID-19, MRNA, LNP-S, PF, 30 MCG/0.3 ML DOSE VACCINE: CPT | Mod: ,,, | Performed by: INTERNAL MEDICINE

## 2021-01-07 ENCOUNTER — IMMUNIZATION (OUTPATIENT)
Dept: INTERNAL MEDICINE | Facility: CLINIC | Age: 46
End: 2021-01-07
Payer: COMMERCIAL

## 2021-01-07 DIAGNOSIS — Z23 NEED FOR VACCINATION: ICD-10-CM

## 2021-01-07 PROCEDURE — 0002A COVID-19, MRNA, LNP-S, PF, 30 MCG/0.3 ML DOSE VACCINE: CPT | Mod: PBBFAC | Performed by: INTERNAL MEDICINE

## 2021-01-07 PROCEDURE — 91300 COVID-19, MRNA, LNP-S, PF, 30 MCG/0.3 ML DOSE VACCINE: CPT | Mod: PBBFAC | Performed by: INTERNAL MEDICINE

## 2021-10-08 ENCOUNTER — IMMUNIZATION (OUTPATIENT)
Dept: INTERNAL MEDICINE | Facility: CLINIC | Age: 46
End: 2021-10-08
Payer: COMMERCIAL

## 2021-10-08 DIAGNOSIS — Z23 NEED FOR VACCINATION: Primary | ICD-10-CM

## 2021-10-08 PROCEDURE — 0003A COVID-19, MRNA, LNP-S, PF, 30 MCG/0.3 ML DOSE VACCINE: CPT | Mod: CV19,PBBFAC | Performed by: INTERNAL MEDICINE

## 2021-10-08 PROCEDURE — 91300 COVID-19, MRNA, LNP-S, PF, 30 MCG/0.3 ML DOSE VACCINE: CPT | Mod: PBBFAC | Performed by: INTERNAL MEDICINE

## 2021-10-13 DIAGNOSIS — Z00.00 ROUTINE GENERAL MEDICAL EXAMINATION AT A HEALTH CARE FACILITY: Primary | ICD-10-CM

## 2021-12-03 ENCOUNTER — HOSPITAL ENCOUNTER (OUTPATIENT)
Dept: CARDIOLOGY | Facility: HOSPITAL | Age: 46
Discharge: HOME | End: 2021-12-03
Attending: INTERNAL MEDICINE
Payer: COMMERCIAL

## 2021-12-03 ENCOUNTER — CLINICAL SUPPORT (OUTPATIENT)
Dept: INTERNAL MEDICINE | Facility: CLINIC | Age: 46
End: 2021-12-03
Payer: COMMERCIAL

## 2021-12-03 ENCOUNTER — OFFICE VISIT (OUTPATIENT)
Dept: INTERNAL MEDICINE | Facility: CLINIC | Age: 46
End: 2021-12-03
Payer: COMMERCIAL

## 2021-12-03 ENCOUNTER — HOSPITAL ENCOUNTER (OUTPATIENT)
Dept: RADIOLOGY | Facility: HOSPITAL | Age: 46
Discharge: HOME | End: 2021-12-03
Attending: INTERNAL MEDICINE
Payer: COMMERCIAL

## 2021-12-03 VITALS — BODY MASS INDEX: 21.17 KG/M2 | WEIGHT: 183 LBS | HEIGHT: 78 IN

## 2021-12-03 DIAGNOSIS — Z00.00 ROUTINE GENERAL MEDICAL EXAMINATION AT A HEALTH CARE FACILITY: ICD-10-CM

## 2021-12-03 DIAGNOSIS — Z00.00 ROUTINE GENERAL MEDICAL EXAMINATION AT A HEALTH CARE FACILITY: Primary | ICD-10-CM

## 2021-12-03 DIAGNOSIS — Z00.00 ANNUAL PHYSICAL EXAM: Primary | ICD-10-CM

## 2021-12-03 LAB
ALBUMIN SERPL BCP-MCNC: 4.2 G/DL (ref 3.5–5.2)
ALP SERPL-CCNC: 64 U/L (ref 55–135)
ALT SERPL W/O P-5'-P-CCNC: 15 U/L (ref 10–44)
ANION GAP SERPL CALC-SCNC: 6 MMOL/L (ref 8–16)
AST SERPL-CCNC: 20 U/L (ref 10–40)
BILIRUB SERPL-MCNC: 0.8 MG/DL (ref 0.1–1)
BUN SERPL-MCNC: 14 MG/DL (ref 6–20)
CALCIUM SERPL-MCNC: 9.8 MG/DL (ref 8.7–10.5)
CHLORIDE SERPL-SCNC: 106 MMOL/L (ref 95–110)
CHOLEST SERPL-MCNC: 210 MG/DL (ref 120–199)
CHOLEST/HDLC SERPL: 3.5 {RATIO} (ref 2–5)
CO2 SERPL-SCNC: 28 MMOL/L (ref 23–29)
COMPLEXED PSA SERPL-MCNC: 0.41 NG/ML (ref 0–4)
CREAT SERPL-MCNC: 0.8 MG/DL (ref 0.5–1.4)
CV STRESS BASE HR: 79 BPM
DIASTOLIC BLOOD PRESSURE: 82 MMHG
ERYTHROCYTE [DISTWIDTH] IN BLOOD BY AUTOMATED COUNT: 12.4 % (ref 11.5–14.5)
EST. GFR  (AFRICAN AMERICAN): >60 ML/MIN/1.73 M^2
EST. GFR  (NON AFRICAN AMERICAN): >60 ML/MIN/1.73 M^2
ESTIMATED AVG GLUCOSE: 100 MG/DL (ref 68–131)
GLUCOSE SERPL-MCNC: 91 MG/DL (ref 70–110)
HBA1C MFR BLD: 5.1 % (ref 4–5.6)
HCT VFR BLD AUTO: 47.6 % (ref 40–54)
HCV AB SERPL QL IA: NEGATIVE
HDLC SERPL-MCNC: 60 MG/DL (ref 40–75)
HDLC SERPL: 28.6 % (ref 20–50)
HGB BLD-MCNC: 15.7 G/DL (ref 14–18)
HIV 1+2 AB+HIV1 P24 AG SERPL QL IA: NEGATIVE
LDLC SERPL CALC-MCNC: 132.4 MG/DL (ref 63–159)
MCH RBC QN AUTO: 30.3 PG (ref 27–31)
MCHC RBC AUTO-ENTMCNC: 33 G/DL (ref 32–36)
MCV RBC AUTO: 92 FL (ref 82–98)
NONHDLC SERPL-MCNC: 150 MG/DL
OHS CV CPX 1 MINUTE RECOVERY HEART RATE: 157 BPM
OHS CV CPX 85 PERCENT MAX PREDICTED HEART RATE MALE: 149
OHS CV CPX ESTIMATED METS: 17
OHS CV CPX MAX PREDICTED HEART RATE: 175
OHS CV CPX PATIENT IS FEMALE: 0
OHS CV CPX PATIENT IS MALE: 1
OHS CV CPX PEAK DIASTOLIC BLOOD PRESSURE: 78 MMHG
OHS CV CPX PEAK HEAR RATE: 187 BPM
OHS CV CPX PEAK RATE PRESSURE PRODUCT: NORMAL
OHS CV CPX PEAK SYSTOLIC BLOOD PRESSURE: 162 MMHG
OHS CV CPX PERCENT MAX PREDICTED HEART RATE ACHIEVED: 107
OHS CV CPX RATE PRESSURE PRODUCT PRESENTING: 9954
PLATELET # BLD AUTO: 297 K/UL (ref 150–450)
PMV BLD AUTO: 9.8 FL (ref 9.2–12.9)
POTASSIUM SERPL-SCNC: 5.4 MMOL/L (ref 3.5–5.1)
PROT SERPL-MCNC: 6.9 G/DL (ref 6–8.4)
RBC # BLD AUTO: 5.19 M/UL (ref 4.6–6.2)
SODIUM SERPL-SCNC: 140 MMOL/L (ref 136–145)
STRESS ECHO POST EXERCISE DUR MIN: 10 MINUTES
STRESS ECHO POST EXERCISE DUR SEC: 0 SECONDS
SYSTOLIC BLOOD PRESSURE: 126 MMHG
TRIGL SERPL-MCNC: 88 MG/DL (ref 30–150)
TSH SERPL DL<=0.005 MIU/L-ACNC: 1.44 UIU/ML (ref 0.4–4)
WBC # BLD AUTO: 5.43 K/UL (ref 3.9–12.7)

## 2021-12-03 PROCEDURE — 71046 X-RAY EXAM CHEST 2 VIEWS: CPT | Mod: TC,FY

## 2021-12-03 PROCEDURE — 85027 COMPLETE CBC AUTOMATED: CPT | Performed by: INTERNAL MEDICINE

## 2021-12-03 PROCEDURE — 84443 ASSAY THYROID STIM HORMONE: CPT | Performed by: INTERNAL MEDICINE

## 2021-12-03 PROCEDURE — 99386 PREV VISIT NEW AGE 40-64: CPT | Mod: S$GLB,,, | Performed by: INTERNAL MEDICINE

## 2021-12-03 PROCEDURE — 83036 HEMOGLOBIN GLYCOSYLATED A1C: CPT | Performed by: INTERNAL MEDICINE

## 2021-12-03 PROCEDURE — 93016 CV STRESS TEST SUPVJ ONLY: CPT | Mod: ,,, | Performed by: INTERNAL MEDICINE

## 2021-12-03 PROCEDURE — 80061 LIPID PANEL: CPT | Performed by: INTERNAL MEDICINE

## 2021-12-03 PROCEDURE — 93017 CV STRESS TEST TRACING ONLY: CPT

## 2021-12-03 PROCEDURE — 71046 X-RAY EXAM CHEST 2 VIEWS: CPT | Mod: 26,,, | Performed by: RADIOLOGY

## 2021-12-03 PROCEDURE — 80053 COMPREHEN METABOLIC PANEL: CPT | Performed by: INTERNAL MEDICINE

## 2021-12-03 PROCEDURE — 93018 EXERCISE STRESS - EKG (CUPID ONLY): ICD-10-PCS | Mod: ,,, | Performed by: INTERNAL MEDICINE

## 2021-12-03 PROCEDURE — 99999 PR PBB SHADOW E&M-EST. PATIENT-LVL II: CPT | Mod: PBBFAC,,, | Performed by: INTERNAL MEDICINE

## 2021-12-03 PROCEDURE — 93018 CV STRESS TEST I&R ONLY: CPT | Mod: ,,, | Performed by: INTERNAL MEDICINE

## 2021-12-03 PROCEDURE — 86803 HEPATITIS C AB TEST: CPT | Performed by: INTERNAL MEDICINE

## 2021-12-03 PROCEDURE — 99999 PR PBB SHADOW E&M-EST. PATIENT-LVL II: ICD-10-PCS | Mod: PBBFAC,,, | Performed by: INTERNAL MEDICINE

## 2021-12-03 PROCEDURE — 99386 PR PREVENTIVE VISIT,NEW,40-64: ICD-10-PCS | Mod: S$GLB,,, | Performed by: INTERNAL MEDICINE

## 2021-12-03 PROCEDURE — 71046 XR CHEST PA AND LATERAL: ICD-10-PCS | Mod: 26,,, | Performed by: RADIOLOGY

## 2021-12-03 PROCEDURE — 87389 HIV-1 AG W/HIV-1&-2 AB AG IA: CPT | Performed by: INTERNAL MEDICINE

## 2021-12-03 PROCEDURE — 93016 EXERCISE STRESS - EKG (CUPID ONLY): ICD-10-PCS | Mod: ,,, | Performed by: INTERNAL MEDICINE

## 2021-12-03 PROCEDURE — 84153 ASSAY OF PSA TOTAL: CPT | Performed by: INTERNAL MEDICINE

## 2021-12-13 VITALS
DIASTOLIC BLOOD PRESSURE: 84 MMHG | SYSTOLIC BLOOD PRESSURE: 104 MMHG | BODY MASS INDEX: 22 KG/M2 | WEIGHT: 190.38 LBS | HEART RATE: 114 BPM

## 2022-01-20 ENCOUNTER — OFFICE VISIT (OUTPATIENT)
Dept: OPTOMETRY | Facility: CLINIC | Age: 47
End: 2022-01-20
Payer: COMMERCIAL

## 2022-01-20 DIAGNOSIS — H11.151 PINGUECULA OF RIGHT EYE: Primary | ICD-10-CM

## 2022-01-20 DIAGNOSIS — H52.13 MYOPIA OF BOTH EYES: ICD-10-CM

## 2022-01-20 PROCEDURE — 1159F MED LIST DOCD IN RCRD: CPT | Mod: CPTII,S$GLB,, | Performed by: OPTOMETRIST

## 2022-01-20 PROCEDURE — 1159F PR MEDICATION LIST DOCUMENTED IN MEDICAL RECORD: ICD-10-PCS | Mod: CPTII,S$GLB,, | Performed by: OPTOMETRIST

## 2022-01-20 PROCEDURE — 92014 PR EYE EXAM, EST PATIENT,COMPREHESV: ICD-10-PCS | Mod: S$GLB,,, | Performed by: OPTOMETRIST

## 2022-01-20 PROCEDURE — 92014 COMPRE OPH EXAM EST PT 1/>: CPT | Mod: S$GLB,,, | Performed by: OPTOMETRIST

## 2022-01-20 PROCEDURE — 92015 PR REFRACTION: ICD-10-PCS | Mod: S$GLB,,, | Performed by: OPTOMETRIST

## 2022-01-20 PROCEDURE — 99999 PR PBB SHADOW E&M-EST. PATIENT-LVL II: ICD-10-PCS | Mod: PBBFAC,,, | Performed by: OPTOMETRIST

## 2022-01-20 PROCEDURE — 92015 DETERMINE REFRACTIVE STATE: CPT | Mod: S$GLB,,, | Performed by: OPTOMETRIST

## 2022-01-20 PROCEDURE — 99999 PR PBB SHADOW E&M-EST. PATIENT-LVL II: CPT | Mod: PBBFAC,,, | Performed by: OPTOMETRIST

## 2022-01-20 NOTE — PROGRESS NOTES
HPI     Riley Perez is a 46 y.o. male who returns,  for continued eye care. Riley    was last seen on 11/05/2020. He has bilateral myopia for which glasses are   worn as needed, for distance only. Today, he reports that he has not   noticed any new or concerning ocular or visual symptoms.    (--)blurred vision  (--)Headaches  (--)diplopia  (--)flashes  (--)floaters  (--)pain  (--)Itching  (--)tearing  (--)burning  (--)Dryness  (--) OTC Drops  (--)Photophobia          Last edited by Sofía Taylor, OD on 1/20/2022 11:00 AM. (History)        Review of Systems   Constitutional: Negative for chills, fever and malaise/fatigue.   HENT: Negative for congestion, hearing loss and sore throat.    Eyes: Negative for blurred vision, double vision, photophobia, pain, discharge and redness.   Respiratory: Negative.  Negative for cough, shortness of breath and wheezing.    Cardiovascular: Negative.    Gastrointestinal: Negative.  Negative for nausea and vomiting.   Genitourinary: Negative.    Musculoskeletal: Negative.    Skin: Negative.    Neurological: Negative for seizures.   Psychiatric/Behavioral: Negative.        For exam results, see encounter report    Assessment /Plan     1. Pinguecula of right eye  - Artificial Tears as needed for comfort. Ex: Systane Balance, Soothe XP, Refresh Optive Advanced  - Sun protection  - No specific treatment needed       2. Bilateral Myopia --> fairly stable  - Spec Rx per final Rx below for distance only  Glasses Prescription (1/20/2022)        Sphere Cylinder    Right -1.25 Sphere    Left -1.00 Sphere    Type: SVL    Expiration Date: 1/21/2023          3.  Retinal Health intact OU  - no treatment needed     Patient education; RTC in 1 year, sooner as needed

## 2022-01-20 NOTE — PATIENT INSTRUCTIONS
Pterygium and Pinguecula             A Pterygium is fleshy tissue that grows over the cornea (the clear front window of the eye). It may remain small or may grow large enough to interfere with vision. A pterygium most commonly occurs on the inner corner of the eye, but can appear on the outer corner as well. The exact cause is not well understood. Pterygium occurs more often in people who spend a great deal of time outdoors, especially in [place] climates. Long-term exposure to sunlight, especially ultraviolet (UV) rays, and chronic eye irritation from dry, clarence conditions seem to play an important causal role. A dry eye may contribute to pterygium. When a pterygium becomes red and irritated, eyedrops or ointments may be used to help reduce the inflammation. If the pterygium is large enough to threaten sight or grows rapidly, it can be removed surgically.   Despite proper surgical removal, the pterygium may return, particularly in young people. Protecting the eyes from excessive ultraviolet light with proper sunglasses and avoiding dry, clarence conditions and use of artificial tears may also help.                     Pinguecula    A Pinguecula is a yellowish patch or bump on the white of the eye, most often on the side closest to the nose. It is not a tumor, but an alteration of normal tissue resulting in a deposit of protein and fat. Unlike a pterygium, a pinguecula does not actually grow onto the cornea. A pinguecula may also be a response to chronic eye irritation or sunlight.   No treatment is necessary unless it becomes inflamed. A pinguecula does not grow onto the cornea or threaten sight. If particularly annoying, a pinguecula may on rare occasions be surgically removed, but he postoperative scar may be as cosmetically objectionable as the pinguecula.       Protecting Your Eyes from Solar Radiation  The sun supports all life on our planet, but its life-giving rays also pose dangers.  The suns primary danger  is in the form of Ultraviolet (UV) radiation. UV radiation is a component of solar radiation, but it can also be given off by artificial sources like welding machines, tanning beds and lasers.  Most are aware of the harm UV radiation can do to the skin, but many may not realize that exposure to UV radiation can harm the eyes or that other components of solar radiation can also affect vision.  There are three types of UV radiation: UV-C is absorbed by the ozone layer and does not present any threat; UV-A and UV-B radiation can have adverse long- and short-term effects on the eyes and vision.  If your eyes are exposed to excessive amounts of UV radiation over a short period of time, you are likely to experience an effect called photokeratitis.    Like a sunburn of the eye, photokeratitis may be painful and include symptoms such as red eyes, a foreign body sensation or gritty feeling in the eyes, extreme sensitivity to light and excessive tearing. Fortunately, this is usually temporary and rarely causes permanent damage to the eyes.  Long-term exposure to UV radiation, however, can be more serious. Scientific studies and research have shown that exposure to small amounts of UV radiation over a period of many years increases the chance of developing a cataract and may cause damage to the retina, a nerve-rich lining of the eye that is used for seeing. Additionally, chronic exposure to shorter wavelength visible light (i.e. blue and violet light) may also be harmful to the retina.  The longer the eyes are exposed to solar radiation, the greater the risk of developing later in life such conditions as cataracts or macular degeneration. Since it is not clear how much exposure to solar radiation will cause damage, the AOA recommends wearing quality sunglasses that offer UV protection and wearing a hat or cap with a wide brim whenever you spend time outdoors.    To provide adequate protection for your eyes, sunglasses  should:  block out 99 to 100 percent of both UV-A and UV-B radiation;   screen out 75 to 90 percent of visible light;   be perfectly matched in color and free of distortion and imperfection; and   have lenses that are gray for proper color recognition.  The lenses in sunglasses should be made from polycarbonate or Trivex® material if you participate in potentially eye-hazardous work or sports. These lenses provide the most impact resistance.  If you spend a lot of time outdoors in bright sunlight, wrap around frames can provide additional protection from the harmful solar radiation.  Dont forget protection for children and teenagers. They typically spend more time in the sun than adults.      Adult Vision:   41 to 60 Years of Age    If you are over 40 years of age, you've probably noticed changes in your vision. Difficulty seeing clearly for reading and close work is among the most common problems adults develop between ages 41 to 60. However, this is also the time when other changes in your eyes can start to affect your work and enjoyment of life.   Beginning in the early to mid-forties, most adults may start to experience problems with their ability to see clearly at close distances, especially for reading and computer tasks. This normal aging change in the eye's focusing ability, called presbyopia, will continue to progress over time.      Many people in middle age begin to experience difficulty with their vision.   Initially, you may find you need to hold reading materials farther away to see them clearly. Print in the newspaper or on a restaurant menu may appear blurred, especially under dim lighting. If you already wear prescription glasses or contact lenses to see clearly in the distance, the near vision changes caused by presbyopia can bring about the need to use bifocal or multifocal lenses. If you are nearsighted, you may have discovered that you now need to remove you glasses to see better up close.  Fortunately, people with presbyopia now have many options to improve their ability to see well.   Along with the onset of presbyopia, an increase in the incidence of eye health problems occurs during these years. Whether or not there is a need for eyeglasses, adults should be examined for signs of developing eye and vision problems. A comprehensive eye examination is recommended at least every two years. Don't rely on an insufficient substitute like the limited 's license vision test or other vision screenings to determine if you have an eye or vision problem.  Adults over 40 may be particularly at risk for the development of eye and vision problems if any of the following exist:  Chronic, systemic conditions such as diabetes or high blood pressure.   A family history of glaucoma or macular degeneration.   A highly visually demanding job or work in an eye-hazardous occupation.   Health conditions like high cholesterol, thyroid conditions, anxiety or depression, and arthritis for which you take medications. Many medications, even antihistamines, have ocular side-effects.    Understanding Age-related Vision Changes  Just like your body, your eyes and vision change over time. Aging changes in various parts of the eye can result in a number of noticeable differences in how well you see. While not everyone will experience the same level of symptoms, the following are common age-related vision changes:  Need for More Light  As you age, you need more light to see as well as you did in years past. Brighter lights in your work area or next to your reading chair will help make reading and other near tasks easier.    Difficulty Reading and Doing Close Work  Printed materials are not as clear as before, in part because the lens in your eye becomes less flexible with time. This makes it harder for your eyes to focus near objects with the same ability you had when you were younger.    Problems with Glare  You may notice  additional glare from headlights at night or sun reflecting off of windshields or pavement during the day, making it more difficult to drive. Changes within the lens in your eye cause light entering the eye to be scattered rather than focused precisely on the retina, thus creating more glare.    Changes in Color Perception  The normally clear lens located inside your eye may start to discolor making it harder to see and distinguish between certain shades of colors.    Reduced Tear Production  With age, the tear glands in your eyes will produce fewer tears. This is particularly true for women after menopause. As a result, your eyes may feel dry and irritated. Having an adequate amount of tears is an essential element in keeping your eyes healthy and maintaining clear sight.      Encountering Problems with Near Vision after 40  If you have enjoyed relatively good vision throughout your life and haven't needed eyeglasses or contact lenses to correct distance vision, then the development of near vision problems after age 40 can be somewhat of a concern and a frustration. Losing the ability to read the newspaper or see the cell phone numbers may seem to have occurred abruptly. Actually, these changes have been occurring gradually since childhood. But up until now, your eyes have had adequate focusing power to allow you to see clearly for reading and close work. Now your eyes no longer have enough focusing power for clear and comfortable near vision tasks.      Persons with presbyopia have several options available to regain clear near vision.   This loss of focusing ability for near vision, called presbyopia, is simply the result of the lens inside the eye becoming less flexible. This flexibility allows the eye to change focus from objects are far to objects that are close. Persons with presbyopia have several options available to regain clear near vision. They include:  Eyeglasses, including single vision reading  glasses and multifocal lenses   Contact lenses, including monovision and bifocal lenses   Laser surgery and other refractive surgery procedures  As you continue to age through your 50s and beyond, presbyopia becomes more advanced. You may notice the need for more frequent changes in eyeglass or contact lens prescriptions. Around age 60, these changes in near vision should stop and prescription changes should occur less frequently.  Presbyopia can't be prevented or cured, but many options are available to help compensate for the loss of near focusing ability. Most individuals should be able to obtain clear, comfortable near vision for all of their lifestyle needs.       Warning Signs of Eye Health Problems  This is also the time in life when your risk for developing a number of eye and vision problems increases. If you experience any of the following symptoms, you may have the early warning signs of a serious eye health problem:  Fluctuating Vision  If you experience frequent changes in how clearly you can see, it may be a sign of diabetes or hypertension (high blood pressure). These chronic conditions can damage the tiny blood vessels in the retina, the light sensitive layer at the back of the eye, causing vision loss that can sometimes be permanent.    Seeing Floaters and Flashes   Occasionally, you may see spots or floaters in your eyes. In most cases, these are actually shadowy images of particles floating in the fluid that fills the inside of the eye. Although they can be bothersome, spots and floaters are usually harmless and typically do not risk vision. They are a natural part of the eye's aging process. But if you suddenly see more floaters than normal, and they are accompanied by bright, flashing lights, they may be a warning sign of impending retinal detachment--a tear of the retina. This should be treated immediately to prevent serious loss of vision. (Link to Spots and Floaters)    Loss of Side  Vision  If it seems that you are losing peripheral or side vision, this may be a sign of glaucoma. Glaucoma occurs when the optic nerve is damaged and no longer transmits all visual images to the brain. It often has no symptoms until damage to sections of your vision has begun. (Link to glaucoma)    Seeing distorted images   If straight lines appear distorted or wavy or there appears to be a blind spot or empty area in the center of your vision, you may have the signs of age-related macular degeneration (AMD). The disease affects the macula, the part of your retina that is responsible for central vision where the eye's acuity is sharpest. The disease causes a blind spot that's right in the middle of your field of vision. (Link to age-related macular degeneration)   Regular eye examinations and early diagnosis and treatment of eye diseases can help you continue to preserve good vision throughout life.      Courtesy of [employer]

## 2022-09-16 ENCOUNTER — IMMUNIZATION (OUTPATIENT)
Dept: PRIMARY CARE CLINIC | Facility: CLINIC | Age: 47
End: 2022-09-16
Payer: COMMERCIAL

## 2022-09-16 DIAGNOSIS — Z23 NEED FOR VACCINATION: Primary | ICD-10-CM

## 2022-09-16 PROCEDURE — 0124A COVID-19, MRNA, LNP-S, BIVALENT BOOSTER, PF, 30 MCG/0.3 ML DOSE: CPT | Mod: PBBFAC | Performed by: INTERNAL MEDICINE

## 2022-09-16 PROCEDURE — 91312 COVID-19, MRNA, LNP-S, BIVALENT BOOSTER, PF, 30 MCG/0.3 ML DOSE: CPT | Mod: PBBFAC | Performed by: INTERNAL MEDICINE

## 2022-12-23 ENCOUNTER — PATIENT MESSAGE (OUTPATIENT)
Dept: INTERNAL MEDICINE | Facility: CLINIC | Age: 47
End: 2022-12-23

## 2022-12-23 ENCOUNTER — CLINICAL SUPPORT (OUTPATIENT)
Dept: INTERNAL MEDICINE | Facility: CLINIC | Age: 47
End: 2022-12-23
Payer: COMMERCIAL

## 2022-12-23 ENCOUNTER — OFFICE VISIT (OUTPATIENT)
Dept: INTERNAL MEDICINE | Facility: CLINIC | Age: 47
End: 2022-12-23
Payer: COMMERCIAL

## 2022-12-23 VITALS
HEIGHT: 78 IN | DIASTOLIC BLOOD PRESSURE: 78 MMHG | HEART RATE: 91 BPM | SYSTOLIC BLOOD PRESSURE: 138 MMHG | WEIGHT: 194 LBS | TEMPERATURE: 98 F | OXYGEN SATURATION: 98 % | BODY MASS INDEX: 22.45 KG/M2

## 2022-12-23 DIAGNOSIS — Z00.00 ENCOUNTER FOR ANNUAL HEALTH EXAMINATION: Primary | ICD-10-CM

## 2022-12-23 DIAGNOSIS — Z00.00 ANNUAL PHYSICAL EXAM: Primary | ICD-10-CM

## 2022-12-23 DIAGNOSIS — Z12.11 COLON CANCER SCREENING: ICD-10-CM

## 2022-12-23 LAB
ALBUMIN SERPL BCP-MCNC: 4.1 G/DL (ref 3.5–5.2)
ALP SERPL-CCNC: 72 U/L (ref 55–135)
ALT SERPL W/O P-5'-P-CCNC: 19 U/L (ref 10–44)
ANION GAP SERPL CALC-SCNC: 11 MMOL/L (ref 8–16)
AST SERPL-CCNC: 24 U/L (ref 10–40)
BILIRUB SERPL-MCNC: 0.5 MG/DL (ref 0.1–1)
BUN SERPL-MCNC: 12 MG/DL (ref 6–20)
CALCIUM SERPL-MCNC: 9.6 MG/DL (ref 8.7–10.5)
CHLORIDE SERPL-SCNC: 104 MMOL/L (ref 95–110)
CHOLEST SERPL-MCNC: 196 MG/DL (ref 120–199)
CHOLEST/HDLC SERPL: 3.5 {RATIO} (ref 2–5)
CO2 SERPL-SCNC: 26 MMOL/L (ref 23–29)
COMPLEXED PSA SERPL-MCNC: 0.42 NG/ML (ref 0–4)
CREAT SERPL-MCNC: 0.9 MG/DL (ref 0.5–1.4)
ERYTHROCYTE [DISTWIDTH] IN BLOOD BY AUTOMATED COUNT: 12.5 % (ref 11.5–14.5)
EST. GFR  (NO RACE VARIABLE): >60 ML/MIN/1.73 M^2
ESTIMATED AVG GLUCOSE: 100 MG/DL (ref 68–131)
GLUCOSE SERPL-MCNC: 80 MG/DL (ref 70–110)
HBA1C MFR BLD: 5.1 % (ref 4–5.6)
HCT VFR BLD AUTO: 49.2 % (ref 40–54)
HDLC SERPL-MCNC: 56 MG/DL (ref 40–75)
HDLC SERPL: 28.6 % (ref 20–50)
HGB BLD-MCNC: 15.5 G/DL (ref 14–18)
LDLC SERPL CALC-MCNC: 120 MG/DL (ref 63–159)
MCH RBC QN AUTO: 29.6 PG (ref 27–31)
MCHC RBC AUTO-ENTMCNC: 31.5 G/DL (ref 32–36)
MCV RBC AUTO: 94 FL (ref 82–98)
NONHDLC SERPL-MCNC: 140 MG/DL
PLATELET # BLD AUTO: 345 K/UL (ref 150–450)
PMV BLD AUTO: 10.1 FL (ref 9.2–12.9)
POTASSIUM SERPL-SCNC: 4.3 MMOL/L (ref 3.5–5.1)
PROT SERPL-MCNC: 7.3 G/DL (ref 6–8.4)
RBC # BLD AUTO: 5.24 M/UL (ref 4.6–6.2)
SODIUM SERPL-SCNC: 141 MMOL/L (ref 136–145)
TRIGL SERPL-MCNC: 100 MG/DL (ref 30–150)
TSH SERPL DL<=0.005 MIU/L-ACNC: 1.95 UIU/ML (ref 0.4–4)
WBC # BLD AUTO: 6.64 K/UL (ref 3.9–12.7)

## 2022-12-23 PROCEDURE — 83036 HEMOGLOBIN GLYCOSYLATED A1C: CPT | Performed by: INTERNAL MEDICINE

## 2022-12-23 PROCEDURE — 1159F PR MEDICATION LIST DOCUMENTED IN MEDICAL RECORD: ICD-10-PCS | Mod: CPTII,S$GLB,, | Performed by: INTERNAL MEDICINE

## 2022-12-23 PROCEDURE — 99396 PR PREVENTIVE VISIT,EST,40-64: ICD-10-PCS | Mod: S$GLB,,, | Performed by: INTERNAL MEDICINE

## 2022-12-23 PROCEDURE — 84443 ASSAY THYROID STIM HORMONE: CPT | Performed by: INTERNAL MEDICINE

## 2022-12-23 PROCEDURE — 3078F DIAST BP <80 MM HG: CPT | Mod: CPTII,S$GLB,, | Performed by: INTERNAL MEDICINE

## 2022-12-23 PROCEDURE — 80061 LIPID PANEL: CPT | Performed by: INTERNAL MEDICINE

## 2022-12-23 PROCEDURE — 99999 PR PBB SHADOW E&M-EST. PATIENT-LVL III: CPT | Mod: PBBFAC,,, | Performed by: INTERNAL MEDICINE

## 2022-12-23 PROCEDURE — 84153 ASSAY OF PSA TOTAL: CPT | Performed by: INTERNAL MEDICINE

## 2022-12-23 PROCEDURE — 3008F PR BODY MASS INDEX (BMI) DOCUMENTED: ICD-10-PCS | Mod: CPTII,S$GLB,, | Performed by: INTERNAL MEDICINE

## 2022-12-23 PROCEDURE — 85027 COMPLETE CBC AUTOMATED: CPT | Performed by: INTERNAL MEDICINE

## 2022-12-23 PROCEDURE — 3075F SYST BP GE 130 - 139MM HG: CPT | Mod: CPTII,S$GLB,, | Performed by: INTERNAL MEDICINE

## 2022-12-23 PROCEDURE — 3075F PR MOST RECENT SYSTOLIC BLOOD PRESS GE 130-139MM HG: ICD-10-PCS | Mod: CPTII,S$GLB,, | Performed by: INTERNAL MEDICINE

## 2022-12-23 PROCEDURE — 1159F MED LIST DOCD IN RCRD: CPT | Mod: CPTII,S$GLB,, | Performed by: INTERNAL MEDICINE

## 2022-12-23 PROCEDURE — 99396 PREV VISIT EST AGE 40-64: CPT | Mod: S$GLB,,, | Performed by: INTERNAL MEDICINE

## 2022-12-23 PROCEDURE — 3078F PR MOST RECENT DIASTOLIC BLOOD PRESSURE < 80 MM HG: ICD-10-PCS | Mod: CPTII,S$GLB,, | Performed by: INTERNAL MEDICINE

## 2022-12-23 PROCEDURE — 3008F BODY MASS INDEX DOCD: CPT | Mod: CPTII,S$GLB,, | Performed by: INTERNAL MEDICINE

## 2022-12-23 PROCEDURE — 80053 COMPREHEN METABOLIC PANEL: CPT | Performed by: INTERNAL MEDICINE

## 2022-12-23 PROCEDURE — 99999 PR PBB SHADOW E&M-EST. PATIENT-LVL III: ICD-10-PCS | Mod: PBBFAC,,, | Performed by: INTERNAL MEDICINE

## 2022-12-23 NOTE — PROGRESS NOTES
Subjective:       Patient ID: Riley Perez is a 47 y.o. male.    Chief Complaint:  wellness exam    HPI  Annual health exam. Reviewed medical, surgical, social and family history, medications, appropriate preventive health screenings, as well as vaccination history. Updates as noted below or in assessment and plan.    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 Generally well. Occasional paraspinal low back pains. Uses Motrin prn. Mild flair currently, going to chiropractor today, massage tomorrow.   Borderline LDL with otherwise normal lipids and glucose screening previously.    Review of Systems   All other systems reviewed and are negative.    History reviewed. No pertinent past medical history.    No current outpatient medications on file.    History reviewed. No pertinent surgical history.    Family History   Problem Relation Age of Onset    Thyroid disease Mother     Diabetes Mellitus Mother     Hyperlipidemia Mother     Myasthenia gravis Father     Retinal detachment Neg Hx     Macular degeneration Neg Hx     Diabetes Neg Hx     Colon cancer Neg Hx        Social History     Tobacco Use    Smoking status: Never    Smokeless tobacco: Never   Substance Use Topics    Alcohol use: Yes     Comment: Occasional       Immunization History   Administered Date(s) Administered    COVID-19, MRNA, LN-S, PF (Pfizer) (Purple Cap) 12/17/2020, 01/07/2021, 10/08/2021    COVID-19, mRNA, LNP-S, bivalent booster, PF (PFIZER OMICRON) 09/16/2022         Objective:      Vitals:    12/23/22 0758   BP: 138/78   Pulse: 91   Temp: 97.8 °F (36.6 °C)       Physical Exam  Constitutional:       General: He is not in acute distress.     Appearance: Normal appearance. He is well-developed. He is not ill-appearing.   HENT:      Head: Normocephalic and atraumatic.      Right Ear: Hearing and tympanic membrane normal. There is no impacted cerumen.      Left Ear: Hearing and tympanic membrane normal. There is no  impacted cerumen.      Nose: Nose normal.      Mouth/Throat:      Mouth: Mucous membranes are moist.      Pharynx: Oropharynx is clear.   Eyes:      Extraocular Movements: Extraocular movements intact.      Conjunctiva/sclera: Conjunctivae normal.      Pupils: Pupils are equal, round, and reactive to light.   Neck:      Vascular: No carotid bruit.   Cardiovascular:      Rate and Rhythm: Normal rate and regular rhythm.      Heart sounds: Normal heart sounds. No murmur heard.  Pulmonary:      Effort: Pulmonary effort is normal. No respiratory distress.      Breath sounds: Normal breath sounds. No wheezing, rhonchi or rales.   Abdominal:      General: Abdomen is flat. There is no distension.      Palpations: Abdomen is soft. There is no mass.      Tenderness: There is no abdominal tenderness.      Hernia: No hernia is present.   Musculoskeletal:         General: No swelling or deformity.      Cervical back: No tenderness.      Right lower leg: No edema.      Left lower leg: No edema.   Lymphadenopathy:      Cervical: No cervical adenopathy.   Skin:     General: Skin is warm and dry.      Findings: No lesion or rash.   Neurological:      General: No focal deficit present.      Mental Status: He is alert and oriented to person, place, and time.      Cranial Nerves: No cranial nerve deficit.      Coordination: Coordination normal.      Gait: Gait normal.      Deep Tendon Reflexes: Reflexes normal.   Psychiatric:         Mood and Affect: Mood normal.         Behavior: Behavior normal.         Thought Content: Thought content normal.         Judgment: Judgment normal.         Assessment/Plan:     1) Annual wellness exam  2) LBP - Likely muscular strain. Mild, intermittent issue. Agree with chiropractor, massage, Motrin prn. He will be working on sitting posture as well.    - Tetanus booster every 10 yrs.  - Referring for 1st screening colonoscopy.  - No concerning skin lesions today.  - Blood pressure normal.  - Lipid and  glucose screening today. Borderline LDL in past. Continue saturated fat limitation. Unless concerning elevation this year, plan for repeat metabolic screening 1 yr.

## 2023-02-14 ENCOUNTER — CLINICAL SUPPORT (OUTPATIENT)
Dept: ENDOSCOPY | Facility: HOSPITAL | Age: 48
End: 2023-02-14
Attending: INTERNAL MEDICINE
Payer: COMMERCIAL

## 2023-02-14 VITALS — HEIGHT: 78 IN | BODY MASS INDEX: 21.98 KG/M2 | WEIGHT: 190 LBS

## 2023-02-14 DIAGNOSIS — Z12.11 COLON CANCER SCREENING: ICD-10-CM

## 2023-02-14 RX ORDER — POLYETHYLENE GLYCOL 3350, SODIUM SULFATE ANHYDROUS, SODIUM BICARBONATE, SODIUM CHLORIDE, POTASSIUM CHLORIDE 236; 22.74; 6.74; 5.86; 2.97 G/4L; G/4L; G/4L; G/4L; G/4L
4 POWDER, FOR SOLUTION ORAL ONCE
Qty: 4000 ML | Refills: 0 | Status: SHIPPED | OUTPATIENT
Start: 2023-02-14 | End: 2023-02-24

## 2023-04-17 ENCOUNTER — HOSPITAL ENCOUNTER (OUTPATIENT)
Facility: HOSPITAL | Age: 48
Discharge: HOME | End: 2023-04-17
Attending: INTERNAL MEDICINE | Admitting: INTERNAL MEDICINE
Payer: COMMERCIAL

## 2023-04-17 ENCOUNTER — ANESTHESIA (OUTPATIENT)
Dept: ENDOSCOPY | Facility: HOSPITAL | Age: 48
End: 2023-04-17
Payer: COMMERCIAL

## 2023-04-17 ENCOUNTER — ANESTHESIA EVENT (OUTPATIENT)
Dept: ENDOSCOPY | Facility: HOSPITAL | Age: 48
End: 2023-04-17
Payer: COMMERCIAL

## 2023-04-17 VITALS
OXYGEN SATURATION: 100 % | BODY MASS INDEX: 20.83 KG/M2 | RESPIRATION RATE: 18 BRPM | HEIGHT: 78 IN | WEIGHT: 180 LBS | TEMPERATURE: 98 F | HEART RATE: 68 BPM | DIASTOLIC BLOOD PRESSURE: 65 MMHG | SYSTOLIC BLOOD PRESSURE: 114 MMHG

## 2023-04-17 DIAGNOSIS — Z12.11 SCREEN FOR COLON CANCER: ICD-10-CM

## 2023-04-17 PROCEDURE — 45385 COLONOSCOPY W/LESION REMOVAL: CPT | Mod: PT | Performed by: INTERNAL MEDICINE

## 2023-04-17 PROCEDURE — E9220 PRA ENDO ANESTHESIA: ICD-10-PCS | Mod: 33,,, | Performed by: NURSE ANESTHETIST, CERTIFIED REGISTERED

## 2023-04-17 PROCEDURE — 25000003 PHARM REV CODE 250: Performed by: NURSE ANESTHETIST, CERTIFIED REGISTERED

## 2023-04-17 PROCEDURE — 25000003 PHARM REV CODE 250: Performed by: INTERNAL MEDICINE

## 2023-04-17 PROCEDURE — E9220 PRA ENDO ANESTHESIA: HCPCS | Mod: 33,,, | Performed by: NURSE ANESTHETIST, CERTIFIED REGISTERED

## 2023-04-17 PROCEDURE — 88305 TISSUE EXAM BY PATHOLOGIST: CPT | Mod: 26,,, | Performed by: PATHOLOGY

## 2023-04-17 PROCEDURE — 45385 PR COLONOSCOPY,REMV LESN,SNARE: ICD-10-PCS | Mod: 33,,, | Performed by: INTERNAL MEDICINE

## 2023-04-17 PROCEDURE — 88305 TISSUE EXAM BY PATHOLOGIST: CPT | Performed by: PATHOLOGY

## 2023-04-17 PROCEDURE — 27201089 HC SNARE, DISP (ANY): Performed by: INTERNAL MEDICINE

## 2023-04-17 PROCEDURE — 37000009 HC ANESTHESIA EA ADD 15 MINS: Performed by: INTERNAL MEDICINE

## 2023-04-17 PROCEDURE — 63600175 PHARM REV CODE 636 W HCPCS: Performed by: NURSE ANESTHETIST, CERTIFIED REGISTERED

## 2023-04-17 PROCEDURE — 88305 TISSUE EXAM BY PATHOLOGIST: ICD-10-PCS | Mod: 26,,, | Performed by: PATHOLOGY

## 2023-04-17 PROCEDURE — 45385 COLONOSCOPY W/LESION REMOVAL: CPT | Mod: 33,,, | Performed by: INTERNAL MEDICINE

## 2023-04-17 PROCEDURE — 37000008 HC ANESTHESIA 1ST 15 MINUTES: Performed by: INTERNAL MEDICINE

## 2023-04-17 RX ORDER — SODIUM CHLORIDE 9 MG/ML
INJECTION, SOLUTION INTRAVENOUS CONTINUOUS
Status: DISCONTINUED | OUTPATIENT
Start: 2023-04-17 | End: 2023-04-17 | Stop reason: HOSPADM

## 2023-04-17 RX ORDER — PROPOFOL 10 MG/ML
VIAL (ML) INTRAVENOUS CONTINUOUS PRN
Status: DISCONTINUED | OUTPATIENT
Start: 2023-04-17 | End: 2023-04-17

## 2023-04-17 RX ORDER — PROPOFOL 10 MG/ML
VIAL (ML) INTRAVENOUS
Status: DISCONTINUED | OUTPATIENT
Start: 2023-04-17 | End: 2023-04-17

## 2023-04-17 RX ORDER — LIDOCAINE HYDROCHLORIDE 20 MG/ML
INJECTION INTRAVENOUS
Status: DISCONTINUED | OUTPATIENT
Start: 2023-04-17 | End: 2023-04-17

## 2023-04-17 RX ADMIN — LIDOCAINE HYDROCHLORIDE 50 MG: 20 INJECTION INTRAVENOUS at 10:04

## 2023-04-17 RX ADMIN — SODIUM CHLORIDE: 9 INJECTION, SOLUTION INTRAVENOUS at 08:04

## 2023-04-17 RX ADMIN — PROPOFOL 25 MG: 10 INJECTION, EMULSION INTRAVENOUS at 10:04

## 2023-04-17 RX ADMIN — SODIUM CHLORIDE: 0.9 INJECTION, SOLUTION INTRAVENOUS at 10:04

## 2023-04-17 RX ADMIN — Medication 175 MCG/KG/MIN: at 10:04

## 2023-04-17 RX ADMIN — PROPOFOL 100 MG: 10 INJECTION, EMULSION INTRAVENOUS at 10:04

## 2023-04-17 NOTE — PR[employer]ATION PATIENT INSTRUCTIONS
Discharge Summary/Instructions after an Endoscopic Procedure  Patient Name: Riley Perez  Patient MRN: [id number]  Patient YOB: 1975 Monday, April 17, 2023  Dayo Smith MD  Dear patient,  As a result of recent federal legislation (The Federal Cures Act), you may   receive lab or pathology results from your procedure in your [facility]   account before your physician is able to contact you. Your physician or   their representative will relay the results to you with their   recommendations at their soonest availability.  Thank you,  RESTRICTIONS:  During your procedure today, you received medications for sedation.  These   medications may affect your judgment, balance and coordination.  Therefore,   for 24 hours, you have the following restrictions:   - DO NOT drive a car, operate machinery, make legal/financial decisions,   sign important papers or drink alcohol.    ACTIVITY:  Today: no heavy lifting, straining or running due to procedural   sedation/anesthesia.  The following day: return to full activity including work.  DIET:  Eat and drink normally unless instructed otherwise.     TREATMENT FOR COMMON SIDE EFFECTS:  - Mild abdominal pain, nausea, belching, bloating or excessive gas:  rest,   eat lightly and use a heating pad.  - Sore Throat: treat with throat lozenges and/or gargle with warm salt   water.  - Because air was used during the procedure, expelling large amounts of air   from your rectum or belching is normal.  - If a bowel prep was taken, you may not have a bowel movement for 1-3 days.    This is normal.  SYMPTOMS TO WATCH FOR AND REPORT TO YOUR PHYSICIAN:  1. Abdominal pain or bloating, other than gas cramps.  2. Chest pain.  3. Back pain.  4. Signs of infection such as: chills or fever occurring within 24 hours   after the procedure.  5. Rectal bleeding, which would show as bright red, maroon, or black stools.   (A tablespoon of blood from the rectum is not serious, especially if    hemorrhoids are present.)  6. Vomiting.  7. Weakness or dizziness.  GO DIRECTLY TO THE NEAREST EMERGENCY ROOM IF YOU HAVE ANY OF THE FOLLOWING:      Difficulty breathing              Chills and/or fever over 101 F   Persistent vomiting and/or vomiting blood   Severe abdominal pain   Severe chest pain   Black, tarry stools   Bleeding- more than one tablespoon   Any other symptom or condition that you feel may need urgent attention  Your doctor recommends these additional instructions:  If any biopsies were taken, your doctors clinic will contact you in 1 to 2   weeks with any results.  - Discharge patient to home.   - Await pathology results.   - Telephone endoscopist for pathology results in 3 weeks.   - Repeat colonoscopy in 5 years for surveillance based on pathology results.     - Return to referring physician.   - The findings and recommendations were discussed with the patient.  For questions, problems or results please call your physician - Dayo Smith MD at Work:  [phone].  [facility], EMERGENCY ROOM PHONE NUMBER: [phone]  IF A COMPLICATION OR EMERGENCY SITUATION ARISES AND YOU ARE UNABLE TO REACH   YOUR PHYSICIAN - GO DIRECTLY TO THE EMERGENCY ROOM.  Dayo Smith MD  4/17/2023 11:08:23 AM  This report has been verified and signed electronically.  Dear patient,  As a result of recent federal legislation (The Federal Cures Act), you may   receive lab or pathology results from your procedure in your [facility]   account before your physician is able to contact you. Your physician or   their representative will relay the results to you with their   recommendations at their soonest availability.  Thank you,  [employer]ATION

## 2023-04-17 NOTE — ANESTHESIA PREPROCEDURE EVALUATION
04/17/2023  Riley Perez is a 47 y.o., male.  History reviewed. No pertinent past medical history.  History reviewed. No pertinent surgical history.      Pre-op Assessment    I have reviewed the Patient Summary Reports.     I have reviewed the Nursing Notes. I have reviewed the NPO Status.   I have reviewed the Medications.     Review of Systems  Anesthesia Hx:  No problems with previous Anesthesia  Denies Family Hx of Anesthesia complications.   Denies Personal Hx of Anesthesia complications.   Hematology/Oncology:  Hematology Normal   Oncology Normal     EENT/Dental:EENT/Dental Normal   Cardiovascular:  Cardiovascular Normal     Pulmonary:  Pulmonary Normal    Renal/:  Renal/ Normal     Hepatic/GI:   Bowel Prep.    Musculoskeletal:  Musculoskeletal Normal    Neurological:  Neurology Normal    Endocrine:  Endocrine Normal    Dermatological:  Skin Normal    Psych:  Psychiatric Normal           Physical Exam  General: Well nourished    Airway:  Mallampati: II   Mouth Opening: Normal  TM Distance: Normal  Tongue: Normal  Neck ROM: Normal ROM    Dental:  Intact        Anesthesia Plan  Type of Anesthesia, risks & benefits discussed:    Anesthesia Type: Gen Natural Airway  Intra-op Monitoring Plan: Standard ASA Monitors  Informed Consent: Informed consent signed with the Patient and all parties understand the risks and agree with anesthesia plan.  All questions answered.   ASA Score: 1  Day of Surgery Review of History & Physical: H&P Update referred to the surgeon/provider.I have interviewed and examined the patient. I have reviewed the patient's H&P dated: There are no significant changes.     Ready For Surgery From Anesthesia Perspective.     .

## 2023-04-17 NOTE — TRANSFER OF CARE
"Anesthesia Transfer of Care Note    Patient: Riley Perez    Procedure(s) Performed: Procedure(s) (LRB):  COLONOSCOPY (N/A)    Patient location: GI    Anesthesia Type: general    Transport from OR: Transported from OR on room air with adequate spontaneous ventilation    Post pain: adequate analgesia    Post assessment: no apparent anesthetic complications    Post vital signs: stable    Level of consciousness: awake    Nausea/Vomiting: no nausea/vomiting    Complications: none    Transfer of care protocol was followed      Last vitals:   Visit Vitals  /64 (BP Location: Left arm, Patient Position: Lying)   Pulse 101   Temp 36.6 °C (97.9 °F) (Temporal)   Resp 16   Ht 6' 6" (1.981 m)   Wt 81.6 kg (180 lb)   SpO2 100%   BMI 20.80 kg/m²     "

## 2023-04-17 NOTE — ANESTHESIA POSTPROCEDURE EVALUATION
Anesthesia Post Evaluation    Patient: Riley Perez    Procedure(s) Performed: Procedure(s) (LRB):  COLONOSCOPY (N/A)    Final Anesthesia Type: general      Patient location during evaluation: PACU  Patient participation: Yes- Able to Participate  Level of consciousness: awake and alert and oriented  Post-procedure vital signs: reviewed and stable  Pain management: adequate  Airway patency: patent    PONV status at discharge: No PONV  Anesthetic complications: no      Cardiovascular status: blood pressure returned to baseline and hemodynamically stable  Respiratory status: unassisted  Hydration status: euvolemic  Follow-up not needed.          Vitals Value Taken Time   /65 04/17/23 1135   Temp 36.5 °C (97.7 °F) 04/17/23 1108   Pulse 68 04/17/23 1135   Resp 18 04/17/23 1135   SpO2 100 % 04/17/23 1135         Event Time   Out of Recovery 11:47:43         Pain/Maco Score: Maco Score: 10 (4/17/2023 11:10 AM)

## 2023-04-21 LAB
FINAL PATHOLOGIC DIAGNOSIS: NORMAL
GROSS: NORMAL
Lab: NORMAL

## 2023-04-22 NOTE — PROGRESS NOTES
Riley your colon polyps were both benign but they were both of the precancerous type of colon polyp sessile serrated and tubular adenoma recommend your next surveillance colonoscopy in 5 years.    Remember your first-degree relatives should go for the 1st screening colonoscopy at age 40 and every 5 years     1. Colon, cecal polyp (polypectomy):     Sessile serrated polyp, no cytologic dysplasia     2. Colon, descending polyp (polypectomy):   Tubular adenoma   Comment: Interp By Sarah Rizo M.D., Signed on 04/21/2023

## 2023-05-11 ENCOUNTER — OFFICE VISIT (OUTPATIENT)
Dept: OPTOMETRY | Facility: CLINIC | Age: 48
End: 2023-05-11
Payer: COMMERCIAL

## 2023-05-11 DIAGNOSIS — H11.151 PINGUECULA OF RIGHT EYE: Primary | ICD-10-CM

## 2023-05-11 PROCEDURE — 92014 COMPRE OPH EXAM EST PT 1/>: CPT | Mod: S$GLB,,, | Performed by: OPTOMETRIST

## 2023-05-11 PROCEDURE — 1159F PR MEDICATION LIST DOCUMENTED IN MEDICAL RECORD: ICD-10-PCS | Mod: CPTII,S$GLB,, | Performed by: OPTOMETRIST

## 2023-05-11 PROCEDURE — 1159F MED LIST DOCD IN RCRD: CPT | Mod: CPTII,S$GLB,, | Performed by: OPTOMETRIST

## 2023-05-11 PROCEDURE — 99999 PR PBB SHADOW E&M-EST. PATIENT-LVL III: ICD-10-PCS | Mod: PBBFAC,,, | Performed by: OPTOMETRIST

## 2023-05-11 PROCEDURE — 99999 PR PBB SHADOW E&M-EST. PATIENT-LVL III: CPT | Mod: PBBFAC,,, | Performed by: OPTOMETRIST

## 2023-05-11 PROCEDURE — 92014 PR EYE EXAM, EST PATIENT,COMPREHESV: ICD-10-PCS | Mod: S$GLB,,, | Performed by: OPTOMETRIST

## 2023-05-11 NOTE — PROGRESS NOTES
ANTONELLA Perez is a 47 y.o. male who returns for continued eye care. His last   exam with me was on 01/20/2022. He has bilateral myopia for which glasses   are worn as needed for distance. Today, he states that he has not noticed   any new or concerning ocular or visual symptoms.    (--)blurred vision  (--)Headaches  (--)diplopia  (--)flashes  (--)floaters  (--)pain  (--)Itching  (--)tearing  (--)burning  (--)Dryness  (--) OTC Drops  (--)Photophobia      Last edited by Sofía Taylor, OD on 5/11/2023  3:48 PM.        For exam results, see encounter report    1. Pinguecula of right eye --> asymptomatic at this point  - Advised use of artificial tears as needed for comfort and redness  - Advised on UV protection  - Can rx a short course of prednisolone acetate 1% drops should pingeuculitis occur in the future    2. Mild, Bilateral Myopia --> stable  - Spec Rx per final Rx below for distance only, as needed  Glasses Prescription (5/11/2023)          Sphere Cylinder Dist [facility]    Right -1.50 Sphere 20/20    Left -1.25 Sphere 20/20      Type: SVL    Expiration Date: 5/11/2024            Patient education; RTC in 1 year with DFE; Ok to instill 0.5% Tropicamide after (normal) baseline workup, sooner as needed at [facility]

## 2023-05-16 NOTE — PATIENT INSTRUCTIONS
Pinguecula                       Pinguecula    A Pinguecula is a yellowish patch or bump on the white of the eye, most often on the side closest to the nose. It is not a tumor, but an alteration of normal tissue resulting in a deposit of protein and fat. A pinguecula does not actually grow onto the cornea. A pinguecula may also be a response to chronic eye irritation or sunlight. No treatment is necessary unless it becomes inflamed. A pinguecula does not grow onto the cornea or threaten sight. If particularly annoying, a pinguecula may on rare occasions be surgically removed, but he postoperative scar may be as cosmetically objectionable as the pinguecula.

## 2023-09-22 ENCOUNTER — PATIENT MESSAGE (OUTPATIENT)
Dept: INTERNAL MEDICINE | Facility: CLINIC | Age: 48
End: 2023-09-22
Payer: COMMERCIAL

## 2023-09-22 DIAGNOSIS — G89.29 RIGHT FLANK PAIN, CHRONIC: Primary | ICD-10-CM

## 2023-09-22 DIAGNOSIS — R10.9 RIGHT FLANK PAIN, CHRONIC: Primary | ICD-10-CM

## 2023-09-28 ENCOUNTER — HOSPITAL ENCOUNTER (OUTPATIENT)
Dept: RADIOLOGY | Facility: OTHER | Age: 48
Discharge: HOME | End: 2023-09-28
Attending: INTERNAL MEDICINE
Payer: COMMERCIAL

## 2023-09-28 DIAGNOSIS — R10.9 RIGHT FLANK PAIN, CHRONIC: ICD-10-CM

## 2023-09-28 DIAGNOSIS — G89.29 RIGHT FLANK PAIN, CHRONIC: ICD-10-CM

## 2023-09-28 PROCEDURE — 76700 US EXAM ABDOM COMPLETE: CPT | Mod: TC

## 2023-09-28 PROCEDURE — 76700 US EXAM ABDOM COMPLETE: CPT | Mod: 26,,, | Performed by: RADIOLOGY

## 2023-09-28 PROCEDURE — 76700 US ABDOMEN COMPLETE: ICD-10-PCS | Mod: 26,,, | Performed by: RADIOLOGY

## 2023-10-17 ENCOUNTER — CLINICAL SUPPORT (OUTPATIENT)
Dept: INTERNAL MEDICINE | Facility: CLINIC | Age: 48
End: 2023-10-17
Payer: COMMERCIAL

## 2023-10-17 ENCOUNTER — OFFICE VISIT (OUTPATIENT)
Dept: INTERNAL MEDICINE | Facility: CLINIC | Age: 48
End: 2023-10-17
Payer: COMMERCIAL

## 2023-10-17 ENCOUNTER — PATIENT MESSAGE (OUTPATIENT)
Dept: INTERNAL MEDICINE | Facility: CLINIC | Age: 48
End: 2023-10-17

## 2023-10-17 VITALS
WEIGHT: 198.44 LBS | SYSTOLIC BLOOD PRESSURE: 119 MMHG | HEART RATE: 91 BPM | HEIGHT: 78 IN | DIASTOLIC BLOOD PRESSURE: 71 MMHG | BODY MASS INDEX: 22.96 KG/M2

## 2023-10-17 DIAGNOSIS — Z00.00 ENCOUNTER FOR ANNUAL HEALTH EXAMINATION: Primary | ICD-10-CM

## 2023-10-17 DIAGNOSIS — Z00.00 ANNUAL PHYSICAL EXAM: Primary | ICD-10-CM

## 2023-10-17 LAB
ALBUMIN SERPL BCP-MCNC: 4.1 G/DL (ref 3.5–5.2)
ALP SERPL-CCNC: 67 U/L (ref 55–135)
ALT SERPL W/O P-5'-P-CCNC: 18 U/L (ref 10–44)
ANION GAP SERPL CALC-SCNC: 10 MMOL/L (ref 8–16)
AST SERPL-CCNC: 20 U/L (ref 10–40)
BILIRUB SERPL-MCNC: 0.5 MG/DL (ref 0.1–1)
BUN SERPL-MCNC: 10 MG/DL (ref 6–20)
CALCIUM SERPL-MCNC: 9.4 MG/DL (ref 8.7–10.5)
CHLORIDE SERPL-SCNC: 107 MMOL/L (ref 95–110)
CHOLEST SERPL-MCNC: 206 MG/DL (ref 120–199)
CHOLEST/HDLC SERPL: 3.6 {RATIO} (ref 2–5)
CO2 SERPL-SCNC: 24 MMOL/L (ref 23–29)
COMPLEXED PSA SERPL-MCNC: 0.42 NG/ML (ref 0–4)
CREAT SERPL-MCNC: 1 MG/DL (ref 0.5–1.4)
ERYTHROCYTE [DISTWIDTH] IN BLOOD BY AUTOMATED COUNT: 12.6 % (ref 11.5–14.5)
EST. GFR  (NO RACE VARIABLE): >60 ML/MIN/1.73 M^2
ESTIMATED AVG GLUCOSE: 108 MG/DL (ref 68–131)
GLUCOSE SERPL-MCNC: 95 MG/DL (ref 70–110)
HBA1C MFR BLD: 5.4 % (ref 4–5.6)
HCT VFR BLD AUTO: 46.3 % (ref 40–54)
HDLC SERPL-MCNC: 58 MG/DL (ref 40–75)
HDLC SERPL: 28.2 % (ref 20–50)
HGB BLD-MCNC: 15.2 G/DL (ref 14–18)
LDLC SERPL CALC-MCNC: 129.6 MG/DL (ref 63–159)
MCH RBC QN AUTO: 30.5 PG (ref 27–31)
MCHC RBC AUTO-ENTMCNC: 32.8 G/DL (ref 32–36)
MCV RBC AUTO: 93 FL (ref 82–98)
NONHDLC SERPL-MCNC: 148 MG/DL
PLATELET # BLD AUTO: 296 K/UL (ref 150–450)
PMV BLD AUTO: 9.6 FL (ref 9.2–12.9)
POTASSIUM SERPL-SCNC: 4.9 MMOL/L (ref 3.5–5.1)
PROT SERPL-MCNC: 7.3 G/DL (ref 6–8.4)
RBC # BLD AUTO: 4.99 M/UL (ref 4.6–6.2)
SODIUM SERPL-SCNC: 141 MMOL/L (ref 136–145)
TRIGL SERPL-MCNC: 92 MG/DL (ref 30–150)
TSH SERPL DL<=0.005 MIU/L-ACNC: 1.07 UIU/ML (ref 0.4–4)
WBC # BLD AUTO: 5.94 K/UL (ref 3.9–12.7)

## 2023-10-17 PROCEDURE — 85027 COMPLETE CBC AUTOMATED: CPT | Performed by: INTERNAL MEDICINE

## 2023-10-17 PROCEDURE — 3044F HG A1C LEVEL LT 7.0%: CPT | Mod: CPTII,S$GLB,, | Performed by: INTERNAL MEDICINE

## 2023-10-17 PROCEDURE — 99999 PR PBB SHADOW E&M-EST. PATIENT-LVL III: CPT | Mod: PBBFAC,,, | Performed by: INTERNAL MEDICINE

## 2023-10-17 PROCEDURE — 84443 ASSAY THYROID STIM HORMONE: CPT | Performed by: INTERNAL MEDICINE

## 2023-10-17 PROCEDURE — 83036 HEMOGLOBIN GLYCOSYLATED A1C: CPT | Performed by: INTERNAL MEDICINE

## 2023-10-17 PROCEDURE — 84153 ASSAY OF PSA TOTAL: CPT | Performed by: INTERNAL MEDICINE

## 2023-10-17 PROCEDURE — 3008F BODY MASS INDEX DOCD: CPT | Mod: CPTII,S$GLB,, | Performed by: INTERNAL MEDICINE

## 2023-10-17 PROCEDURE — 3074F SYST BP LT 130 MM HG: CPT | Mod: CPTII,S$GLB,, | Performed by: INTERNAL MEDICINE

## 2023-10-17 PROCEDURE — 99396 PR PREVENTIVE VISIT,EST,40-64: ICD-10-PCS | Mod: S$GLB,,, | Performed by: INTERNAL MEDICINE

## 2023-10-17 PROCEDURE — 3074F PR MOST RECENT SYSTOLIC BLOOD PRESSURE < 130 MM HG: ICD-10-PCS | Mod: CPTII,S$GLB,, | Performed by: INTERNAL MEDICINE

## 2023-10-17 PROCEDURE — 3044F PR MOST RECENT HEMOGLOBIN A1C LEVEL <7.0%: ICD-10-PCS | Mod: CPTII,S$GLB,, | Performed by: INTERNAL MEDICINE

## 2023-10-17 PROCEDURE — 99396 PREV VISIT EST AGE 40-64: CPT | Mod: S$GLB,,, | Performed by: INTERNAL MEDICINE

## 2023-10-17 PROCEDURE — 3078F DIAST BP <80 MM HG: CPT | Mod: CPTII,S$GLB,, | Performed by: INTERNAL MEDICINE

## 2023-10-17 PROCEDURE — 3008F PR BODY MASS INDEX (BMI) DOCUMENTED: ICD-10-PCS | Mod: CPTII,S$GLB,, | Performed by: INTERNAL MEDICINE

## 2023-10-17 PROCEDURE — 80053 COMPREHEN METABOLIC PANEL: CPT | Performed by: INTERNAL MEDICINE

## 2023-10-17 PROCEDURE — 80061 LIPID PANEL: CPT | Performed by: INTERNAL MEDICINE

## 2023-10-17 PROCEDURE — 99999 PR PBB SHADOW E&M-EST. PATIENT-LVL III: ICD-10-PCS | Mod: PBBFAC,,, | Performed by: INTERNAL MEDICINE

## 2023-10-17 PROCEDURE — 3078F PR MOST RECENT DIASTOLIC BLOOD PRESSURE < 80 MM HG: ICD-10-PCS | Mod: CPTII,S$GLB,, | Performed by: INTERNAL MEDICINE

## 2023-10-17 RX ORDER — TADALAFIL 20 MG/1
TABLET ORAL
COMMUNITY
Start: 2023-09-30

## 2023-10-17 NOTE — PROGRESS NOTES
Subjective:       Patient ID: Riley Perez is a 48 y.o. male.    Chief Complaint: Executive Health      HPI  Annual health exam. Reviewed medical, surgical, social and family history, medications, appropriate preventive health screenings, as well as vaccination history. Updates as noted below or in assessment and plan.    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 Generally well. Watches diet, active. Hx of lumbar pains intermittently without sciatica. Massage and chiropractor help when needed. Currently no back issues. We did agree on an US of liver and kidneys earlier this yr for r/o. This did come back normal.  Very occasional gerd controlled with ppi prn. Drinks coffee regularly.    Review of Systems   All other systems reviewed and are negative.      Past Medical History:   Diagnosis Date    Lumbar pain     Personal history of colonic polyps 2023    Repeat screening 5 yrs.         Current Outpatient Medications:     tadalafiL (CIALIS) 20 MG Tab, Take by mouth., Disp: , Rfl:     Past Surgical History:   Procedure Laterality Date    COLONOSCOPY N/A 4/17/2023    Procedure: COLONOSCOPY;  Surgeon: Dayo Smith MD;  Location: [address]);  Service: Endoscopy;  Laterality: N/A;  instr via portal - PC       Family History   Problem Relation Age of Onset    Thyroid disease Mother     Diabetes Mellitus Mother     Hyperlipidemia Mother     Myasthenia gravis Father     Retinal detachment Neg Hx     Macular degeneration Neg Hx     Diabetes Neg Hx     Colon cancer Neg Hx        Social History     Tobacco Use    Smoking status: Never    Smokeless tobacco: Never   Substance Use Topics    Alcohol use: Yes     Comment: Occasional       Immunization History   Administered Date(s) Administered    COVID-19, MRNA, LN-S, PF (Pfizer) (Purple Cap) 12/17/2020, 01/07/2021, 10/08/2021    COVID-19, mRNA, LNP-S, bivalent booster, PF (PFIZER OMICRON) 09/16/2022    Tdap 02/23/2023          Objective:      Vitals:    10/17/23 0752   BP: 119/71   Pulse: 91       Physical Exam  Constitutional:       General: He is not in acute distress.     Appearance: Normal appearance. He is well-developed. He is not ill-appearing.   HENT:      Head: Normocephalic and atraumatic.      Right Ear: Hearing and tympanic membrane normal. There is no impacted cerumen.      Left Ear: Hearing and tympanic membrane normal. There is no impacted cerumen.      Nose: Nose normal.      Mouth/Throat:      Mouth: Mucous membranes are moist.      Pharynx: Oropharynx is clear.   Eyes:      Extraocular Movements: Extraocular movements intact.      Conjunctiva/sclera: Conjunctivae normal.      Pupils: Pupils are equal, round, and reactive to light.   Neck:      Vascular: No carotid bruit.   Cardiovascular:      Rate and Rhythm: Normal rate and regular rhythm.      Heart sounds: Normal heart sounds. No murmur heard.  Pulmonary:      Effort: Pulmonary effort is normal. No respiratory distress.      Breath sounds: Normal breath sounds. No wheezing, rhonchi or rales.   Abdominal:      General: Abdomen is flat. There is no distension.      Palpations: Abdomen is soft. There is no mass.      Tenderness: There is no abdominal tenderness.      Hernia: No hernia is present.   Musculoskeletal:         General: No swelling or deformity. Normal range of motion.      Cervical back: No tenderness.      Right lower leg: No edema.      Left lower leg: No edema.   Lymphadenopathy:      Cervical: No cervical adenopathy.   Skin:     General: Skin is warm and dry.      Findings: No lesion or rash.   Neurological:      General: No focal deficit present.      Mental Status: He is alert and oriented to person, place, and time.      Cranial Nerves: No cranial nerve deficit.      Coordination: Coordination normal.      Gait: Gait normal.      Deep Tendon Reflexes: Reflexes normal.   Psychiatric:         Mood and Affect: Mood normal.         Behavior: Behavior  normal.         Thought Content: Thought content normal.         Judgment: Judgment normal.       Recent Results (from the past 24 hour(s))   Comprehensive metabolic panel    Collection Time: 10/17/23  7:28 AM   Result Value Ref Range    Sodium 141 136 - 145 mmol/L    Potassium 4.9 3.5 - 5.1 mmol/L    Chloride 107 95 - 110 mmol/L    CO2 24 23 - 29 mmol/L    Glucose 95 70 - 110 mg/dL    BUN 10 6 - 20 mg/dL    Creatinine 1.0 0.5 - 1.4 mg/dL    Calcium 9.4 8.7 - 10.5 mg/dL    Total Protein 7.3 6.0 - 8.4 g/dL    Albumin 4.1 3.5 - 5.2 g/dL    Total Bilirubin 0.5 0.1 - 1.0 mg/dL    Alkaline Phosphatase 67 55 - 135 U/L    AST 20 10 - 40 U/L    ALT 18 10 - 44 U/L    eGFR >60.0 >60 mL/min/1.73 m^2    Anion Gap 10 8 - 16 mmol/L   CBC Without Differential    Collection Time: 10/17/23  7:28 AM   Result Value Ref Range    WBC 5.94 3.90 - 12.70 K/uL    RBC 4.99 4.60 - 6.20 M/uL    Hemoglobin 15.2 14.0 - 18.0 g/dL    Hematocrit 46.3 40.0 - 54.0 %    MCV 93 82 - 98 fL    MCH 30.5 27.0 - 31.0 pg    MCHC 32.8 32.0 - 36.0 g/dL    RDW 12.6 11.5 - 14.5 %    Platelets 296 150 - 450 K/uL    MPV 9.6 9.2 - 12.9 fL   Lipid panel    Collection Time: 10/17/23  7:28 AM   Result Value Ref Range    Cholesterol 206 (H) 120 - 199 mg/dL    Triglycerides 92 30 - 150 mg/dL    HDL 58 40 - 75 mg/dL    LDL Cholesterol 129.6 63.0 - 159.0 mg/dL    HDL/Cholesterol Ratio 28.2 20.0 - 50.0 %    Total Cholesterol/HDL Ratio 3.6 2.0 - 5.0    Non-HDL Cholesterol 148 mg/dL   Hemoglobin A1c    Collection Time: 10/17/23  7:28 AM   Result Value Ref Range    Hemoglobin A1C 5.4 4.0 - 5.6 %    Estimated Avg Glucose 108 68 - 131 mg/dL             Assessment/Plan:     1) Annual wellness exam  2) Lumbago - Intermittent flairs, currently no issues. Seems to respond to massage and chiropractor. Likely muscular.   3) Colon polyp hx (2023) - Planning for 5 yr f/u screening colonoscopy.    - Vaccines up to date. Planning on getting covid booster soon.  - No concerning skin  lesions.  - Blood pressure normal.  - LDL high normal with otherwise healthy lipids. Limit saturated fats. Repeat levels 1 yr.  - A1c trended up slightly but still in normal range. Possibly just a normal deviation in lab results but make sure moderating sugars/carbs. Repeat glucose screening 1 yr.

## 2024-01-27 ENCOUNTER — PATIENT MESSAGE (OUTPATIENT)
Dept: INTERNAL MEDICINE | Facility: CLINIC | Age: 49
End: 2024-01-27
Payer: COMMERCIAL

## 2024-01-27 DIAGNOSIS — L98.9 SKIN LESION: Primary | ICD-10-CM

## 2024-02-06 ENCOUNTER — OFFICE VISIT (OUTPATIENT)
Dept: DERMATOLOGY | Facility: CLINIC | Age: 49
End: 2024-02-06
Payer: COMMERCIAL

## 2024-02-06 DIAGNOSIS — L82.0 INFLAMED SEBORRHEIC KERATOSIS: ICD-10-CM

## 2024-02-06 PROCEDURE — 99999 PR PBB SHADOW E&M-EST. PATIENT-LVL III: CPT | Mod: PBBFAC,,, | Performed by: STUDENT IN AN ORGANIZED HEALTH CARE EDUCATION/TRAINING PROGRAM

## 2024-02-06 PROCEDURE — 99202 OFFICE O/P NEW SF 15 MIN: CPT | Mod: S$GLB,,, | Performed by: STUDENT IN AN ORGANIZED HEALTH CARE EDUCATION/TRAINING PROGRAM

## 2024-02-06 PROCEDURE — 1160F RVW MEDS BY RX/DR IN RCRD: CPT | Mod: CPTII,S$GLB,, | Performed by: STUDENT IN AN ORGANIZED HEALTH CARE EDUCATION/TRAINING PROGRAM

## 2024-02-06 PROCEDURE — 1159F MED LIST DOCD IN RCRD: CPT | Mod: CPTII,S$GLB,, | Performed by: STUDENT IN AN ORGANIZED HEALTH CARE EDUCATION/TRAINING PROGRAM

## 2024-02-06 NOTE — PROGRESS NOTES
Subjective:      Patient ID:  Rliey Perez is a 48 y.o. male who presents for   Chief Complaint   Patient presents with    Lesion     History of Present Illness: The patient presents for lesion L shoulder    Patient with new complaint of lesion(s)  Location: L shoulder  Duration: 1 month  Symptoms: itchy  Relieving factors/Previous treatments: none            Had itching along his upper chest area. About 10 days ago, noticed a raised red itchy area on left collar bone area. It has since gotten flatter but wanted to ensure it was not skin cancer    Review of Systems   Skin:  Positive for itching.       Objective:   Physical Exam   Constitutional: He appears well-developed and well-nourished. No distress.   Neurological: He is alert and oriented to person, place, and time. He is not disoriented.   Psychiatric: He has a normal mood and affect.   Skin:   Areas Examined (abnormalities noted in diagram):   Chest / Axilla Inspection Performed            Diagram Legend     Erythematous scaling macule/papule c/w actinic keratosis       Vascular papule c/w angioma      Pigmented verrucoid papule/plaque c/w seborrheic keratosis      Yellow umbilicated papule c/w sebaceous hyperplasia      Irregularly shaped tan macule c/w lentigo     1-2 mm smooth white papules consistent with Milia      Movable subcutaneous cyst with punctum c/w epidermal inclusion cyst      Subcutaneous movable cyst c/w pilar cyst      Firm pink to brown papule c/w dermatofibroma      Pedunculated fleshy papule(s) c/w skin tag(s)      Evenly pigmented macule c/w junctional nevus     Mildly variegated pigmented, slightly irregular-bordered macule c/w mildly atypical nevus      Flesh colored to evenly pigmented papule c/w intradermal nevus       Pink pearly papule/plaque c/w basal cell carcinoma      Erythematous hyperkeratotic cursted plaque c/w SCC      Surgical scar with no sign of skin cancer recurrence      Open and closed comedones      Inflammatory  papules and pustules      Verrucoid papule consistent consistent with wart     Erythematous eczematous patches and plaques     Dystrophic onycholytic nail with subungual debris c/w onychomycosis     Umbilicated papule    Erythematous-base heme-crusted tan verrucoid plaque consistent with inflamed seborrheic keratosis     Erythematous Silvery Scaling Plaque c/w Psoriasis     See annotation      Assessment / Plan:        Inflamed seborrheic keratosis  -     Ambulatory referral/consult to Dermatology    Overall nonspecific but low suspicion for malignancy. Likely inflamed SK. Reassurance given to patient. No treatment is necessary.   RTC if growing or changing         Follow up if symptoms worsen or fail to improve.

## 2024-05-09 ENCOUNTER — OFFICE VISIT (OUTPATIENT)
Dept: OPTOMETRY | Facility: CLINIC | Age: 49
End: 2024-05-09
Payer: COMMERCIAL

## 2024-05-09 DIAGNOSIS — H52.13 MYOPIA OF BOTH EYES: Primary | ICD-10-CM

## 2024-05-09 PROCEDURE — 92014 COMPRE OPH EXAM EST PT 1/>: CPT | Mod: S$GLB,,, | Performed by: OPTOMETRIST

## 2024-05-09 PROCEDURE — 99999 PR PBB SHADOW E&M-EST. PATIENT-LVL II: CPT | Mod: PBBFAC,,, | Performed by: OPTOMETRIST

## 2024-05-09 PROCEDURE — 1159F MED LIST DOCD IN RCRD: CPT | Mod: CPTII,S$GLB,, | Performed by: OPTOMETRIST

## 2024-05-09 PROCEDURE — 92015 DETERMINE REFRACTIVE STATE: CPT | Mod: S$GLB,,, | Performed by: OPTOMETRIST

## 2024-05-09 NOTE — PROGRESS NOTES
ANTONELLA    Riley Perez is a 48 y.o. male who returns for continued eye care. He has   mild bilateral myopia.  Glasses are prescribed for distance use only.  His   last exam with me was on 05/11/2023. Today, he states that he has not   noticed any new or concerning ocular or visual symptoms.    (--)blurred vision  (--)Headaches  (--)diplopia  (--)flashes  (--)floaters  (--)pain  (--)Itching  (--)tearing  (--)burning  (--)Dryness  (--) OTC Drops  (--)Photophobia      Last edited by Sofía Taylor, OD on 5/9/2024  3:55 PM.        For exam results, see encounter report    Assessment /Plan    Mild, Bilateral Myopia --> stable  - Spec Rx per final Rx below for distance only   Glasses Prescription (5/9/2024)          Sphere Cylinder Dist [facility]    Right -1.50 Sphere 20/20    Left -1.25 Sphere 20/20      Type: SVL    Expiration Date: 5/9/2025          2. Good ocular health    Patient education; RTC in 1 year, sooner as needed

## 2024-09-03 ENCOUNTER — OFFICE VISIT (OUTPATIENT)
Dept: INTERNAL MEDICINE | Facility: CLINIC | Age: 49
End: 2024-09-03
Payer: COMMERCIAL

## 2024-09-03 ENCOUNTER — PATIENT MESSAGE (OUTPATIENT)
Dept: INTERNAL MEDICINE | Facility: CLINIC | Age: 49
End: 2024-09-03

## 2024-09-03 ENCOUNTER — CLINICAL SUPPORT (OUTPATIENT)
Dept: INTERNAL MEDICINE | Facility: CLINIC | Age: 49
End: 2024-09-03
Payer: COMMERCIAL

## 2024-09-03 VITALS
SYSTOLIC BLOOD PRESSURE: 132 MMHG | DIASTOLIC BLOOD PRESSURE: 80 MMHG | HEIGHT: 78 IN | BODY MASS INDEX: 22.58 KG/M2 | WEIGHT: 195.19 LBS | HEART RATE: 105 BPM

## 2024-09-03 DIAGNOSIS — Z00.00 ENCOUNTER FOR ANNUAL HEALTH EXAMINATION: Primary | ICD-10-CM

## 2024-09-03 DIAGNOSIS — N52.9 ERECTILE DYSFUNCTION, UNSPECIFIED ERECTILE DYSFUNCTION TYPE: ICD-10-CM

## 2024-09-03 DIAGNOSIS — Z00.00 ANNUAL PHYSICAL EXAM: Primary | ICD-10-CM

## 2024-09-03 LAB
ALBUMIN SERPL BCP-MCNC: 4.4 G/DL (ref 3.5–5.2)
ALP SERPL-CCNC: 67 U/L (ref 55–135)
ALT SERPL W/O P-5'-P-CCNC: 15 U/L (ref 10–44)
ANION GAP SERPL CALC-SCNC: 8 MMOL/L (ref 8–16)
AST SERPL-CCNC: 20 U/L (ref 10–40)
BILIRUB SERPL-MCNC: 0.8 MG/DL (ref 0.1–1)
BUN SERPL-MCNC: 13 MG/DL (ref 6–20)
CALCIUM SERPL-MCNC: 10 MG/DL (ref 8.7–10.5)
CHLORIDE SERPL-SCNC: 105 MMOL/L (ref 95–110)
CHOLEST SERPL-MCNC: 214 MG/DL (ref 120–199)
CHOLEST/HDLC SERPL: 3.6 {RATIO} (ref 2–5)
CO2 SERPL-SCNC: 26 MMOL/L (ref 23–29)
COMPLEXED PSA SERPL-MCNC: 0.44 NG/ML (ref 0–4)
CREAT SERPL-MCNC: 0.9 MG/DL (ref 0.5–1.4)
ERYTHROCYTE [DISTWIDTH] IN BLOOD BY AUTOMATED COUNT: 12.9 % (ref 11.5–14.5)
EST. GFR  (NO RACE VARIABLE): >60 ML/MIN/1.73 M^2
ESTIMATED AVG GLUCOSE: 100 MG/DL (ref 68–131)
GLUCOSE SERPL-MCNC: 102 MG/DL (ref 70–110)
HBA1C MFR BLD: 5.1 % (ref 4–5.6)
HCT VFR BLD AUTO: 47.7 % (ref 40–54)
HDLC SERPL-MCNC: 60 MG/DL (ref 40–75)
HDLC SERPL: 28 % (ref 20–50)
HGB BLD-MCNC: 15.5 G/DL (ref 14–18)
LDLC SERPL CALC-MCNC: 130 MG/DL (ref 63–159)
MCH RBC QN AUTO: 30.2 PG (ref 27–31)
MCHC RBC AUTO-ENTMCNC: 32.5 G/DL (ref 32–36)
MCV RBC AUTO: 93 FL (ref 82–98)
NONHDLC SERPL-MCNC: 154 MG/DL
PLATELET # BLD AUTO: 297 K/UL (ref 150–450)
PMV BLD AUTO: 9.8 FL (ref 9.2–12.9)
POTASSIUM SERPL-SCNC: 4.3 MMOL/L (ref 3.5–5.1)
PROT SERPL-MCNC: 7.6 G/DL (ref 6–8.4)
RBC # BLD AUTO: 5.14 M/UL (ref 4.6–6.2)
SODIUM SERPL-SCNC: 139 MMOL/L (ref 136–145)
TESTOST SERPL-MCNC: 673 NG/DL (ref 304–1227)
TRIGL SERPL-MCNC: 120 MG/DL (ref 30–150)
TSH SERPL DL<=0.005 MIU/L-ACNC: 1.06 UIU/ML (ref 0.4–4)
WBC # BLD AUTO: 6.85 K/UL (ref 3.9–12.7)

## 2024-09-03 PROCEDURE — 3079F DIAST BP 80-89 MM HG: CPT | Mod: CPTII,S$GLB,, | Performed by: INTERNAL MEDICINE

## 2024-09-03 PROCEDURE — 1159F MED LIST DOCD IN RCRD: CPT | Mod: CPTII,S$GLB,, | Performed by: INTERNAL MEDICINE

## 2024-09-03 PROCEDURE — 85027 COMPLETE CBC AUTOMATED: CPT | Performed by: INTERNAL MEDICINE

## 2024-09-03 PROCEDURE — 3008F BODY MASS INDEX DOCD: CPT | Mod: CPTII,S$GLB,, | Performed by: INTERNAL MEDICINE

## 2024-09-03 PROCEDURE — 99999 PR PBB SHADOW E&M-EST. PATIENT-LVL I: CPT | Mod: PBBFAC,,,

## 2024-09-03 PROCEDURE — 84443 ASSAY THYROID STIM HORMONE: CPT | Performed by: INTERNAL MEDICINE

## 2024-09-03 PROCEDURE — 83036 HEMOGLOBIN GLYCOSYLATED A1C: CPT | Performed by: INTERNAL MEDICINE

## 2024-09-03 PROCEDURE — 84403 ASSAY OF TOTAL TESTOSTERONE: CPT | Performed by: INTERNAL MEDICINE

## 2024-09-03 PROCEDURE — 99396 PREV VISIT EST AGE 40-64: CPT | Mod: S$GLB,,, | Performed by: INTERNAL MEDICINE

## 2024-09-03 PROCEDURE — 80061 LIPID PANEL: CPT | Performed by: INTERNAL MEDICINE

## 2024-09-03 PROCEDURE — 80053 COMPREHEN METABOLIC PANEL: CPT | Performed by: INTERNAL MEDICINE

## 2024-09-03 PROCEDURE — 84153 ASSAY OF PSA TOTAL: CPT | Performed by: INTERNAL MEDICINE

## 2024-09-03 PROCEDURE — 99999 PR PBB SHADOW E&M-EST. PATIENT-LVL III: CPT | Mod: PBBFAC,,, | Performed by: INTERNAL MEDICINE

## 2024-09-03 PROCEDURE — 3075F SYST BP GE 130 - 139MM HG: CPT | Mod: CPTII,S$GLB,, | Performed by: INTERNAL MEDICINE

## 2024-09-03 RX ORDER — TADALAFIL 10 MG/1
10 TABLET ORAL DAILY PRN
Qty: 90 TABLET | Refills: 3 | Status: SHIPPED | OUTPATIENT
Start: 2024-09-03

## 2024-09-03 NOTE — PROGRESS NOTES
Subjective:       Patient ID: Riley Perez is a 49 y.o. male.    Chief Complaint: Executive Health      HPI  Annual health exam. Reviewed medical, surgical, social and family history, medications, appropriate preventive health screenings, as well as vaccination history. Updates as noted below or in assessment and plan.    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 Lumbago hx but this has been not much of an issue this year. Occasional allergy and reflux meds but not a regular issue. Does note maybe some progression of ED. Uses Cialis prn.    Review of Systems   All other systems reviewed and are negative.      Past Medical History:   Diagnosis Date    Lumbar pain     Personal history of colonic polyps 2023    Repeat screening 5 yrs.         Current Outpatient Medications:     tadalafiL (CIALIS) 10 MG tablet, Take 1 tablet (10 mg total) by mouth daily as needed (ED)., Disp: 90 tablet, Rfl: 3    Past Surgical History:   Procedure Laterality Date    COLONOSCOPY N/A 4/17/2023    Procedure: COLONOSCOPY;  Surgeon: Dayo Smith MD;  Location: [address]);  Service: Endoscopy;  Laterality: N/A;  instr via portal - PC       Family History   Problem Relation Name Age of Onset    Thyroid disease Mother      Diabetes Mellitus Mother      Hyperlipidemia Mother      Myasthenia gravis Father      Supraventricular tachycardia Sister      Retinal detachment Neg Hx      Macular degeneration Neg Hx      Diabetes Neg Hx      Colon cancer Neg Hx         Social History     Tobacco Use    Smoking status: Never    Smokeless tobacco: Never   Substance Use Topics    Alcohol use: Yes     Comment: Occasional       Immunization History   Administered Date(s) Administered    COVID-19, MRNA, LN-S, PF (Pfizer) (Purple Cap) 12/17/2020, 01/07/2021, 10/08/2021    COVID-19, mRNA, LNP-S, bivalent booster, PF (PFIZER OMICRON) 09/16/2022    Tdap 02/23/2023         Objective:       Vitals:    09/03/24 0857   BP: 132/80   Pulse: 105       Physical Exam  Constitutional:       General: He is not in acute distress.     Appearance: Normal appearance. He is well-developed. He is not ill-appearing.   HENT:      Head: Normocephalic and atraumatic.      Right Ear: Hearing and tympanic membrane normal. There is no impacted cerumen.      Left Ear: Hearing and tympanic membrane normal. There is no impacted cerumen.      Nose: Nose normal.      Mouth/Throat:      Mouth: Mucous membranes are moist.      Pharynx: Oropharynx is clear.   Eyes:      Extraocular Movements: Extraocular movements intact.      Conjunctiva/sclera: Conjunctivae normal.      Pupils: Pupils are equal, round, and reactive to light.   Neck:      Vascular: No carotid bruit.   Cardiovascular:      Rate and Rhythm: Normal rate and regular rhythm.      Heart sounds: Normal heart sounds. No murmur heard.  Pulmonary:      Effort: Pulmonary effort is normal. No respiratory distress.      Breath sounds: Normal breath sounds. No wheezing, rhonchi or rales.   Abdominal:      General: Abdomen is flat. There is no distension.      Palpations: Abdomen is soft. There is no mass.      Tenderness: There is no abdominal tenderness.      Hernia: No hernia is present.   Musculoskeletal:         General: No swelling or deformity. Normal range of motion.      Cervical back: No tenderness.      Right lower leg: No edema.      Left lower leg: No edema.   Lymphadenopathy:      Cervical: No cervical adenopathy.   Skin:     General: Skin is warm and dry.      Findings: No lesion or rash.   Neurological:      General: No focal deficit present.      Mental Status: He is alert and oriented to person, place, and time.      Cranial Nerves: No cranial nerve deficit.      Coordination: Coordination normal.      Gait: Gait normal.      Deep Tendon Reflexes: Reflexes normal.   Psychiatric:         Mood and Affect: Mood normal.         Behavior: Behavior normal.          Thought Content: Thought content normal.         Judgment: Judgment normal.         Recent Results (from the past 2016 hour(s))   CBC Without Differential    Collection Time: 09/03/24  8:27 AM   Result Value Ref Range    WBC 6.85 3.90 - 12.70 K/uL    RBC 5.14 4.60 - 6.20 M/uL    Hemoglobin 15.5 14.0 - 18.0 g/dL    Hematocrit 47.7 40.0 - 54.0 %    MCV 93 82 - 98 fL    MCH 30.2 27.0 - 31.0 pg    MCHC 32.5 32.0 - 36.0 g/dL    RDW 12.9 11.5 - 14.5 %    Platelets 297 150 - 450 K/uL    MPV 9.8 9.2 - 12.9 fL          Assessment/Plan:     1) Annual wellness exam  2) Lumbago - Doing well without any regular interventions needed this past yr.  3) Colon polyps hx - 2023 with plan for 5 yr f/u colonoscopy.  4) ED - Agreed on checking testosterone level. Will try using daily Cialis at 5 mg and can still use an extra 10 to 15 mg daily as needed if activity is expected.    - Vaccines reviewed.  - No concerning skin lesions.  - Blood pressure normal.  - Lipid and glucose screens today.

## 2025-04-29 ENCOUNTER — OFFICE VISIT (OUTPATIENT)
Dept: OPTOMETRY | Facility: CLINIC | Age: 50
End: 2025-04-29
Payer: COMMERCIAL

## 2025-04-29 DIAGNOSIS — H11.153 PINGUECULA OF BOTH EYES: ICD-10-CM

## 2025-04-29 DIAGNOSIS — H52.13 MYOPIA OF BOTH EYES: Primary | ICD-10-CM

## 2025-04-29 PROCEDURE — 92014 COMPRE OPH EXAM EST PT 1/>: CPT | Mod: S$GLB,,, | Performed by: OPTOMETRIST

## 2025-04-29 PROCEDURE — 1159F MED LIST DOCD IN RCRD: CPT | Mod: CPTII,S$GLB,, | Performed by: OPTOMETRIST

## 2025-04-29 PROCEDURE — 99999 PR PBB SHADOW E&M-EST. PATIENT-LVL II: CPT | Mod: PBBFAC,,, | Performed by: OPTOMETRIST

## 2025-04-29 PROCEDURE — 92015 DETERMINE REFRACTIVE STATE: CPT | Mod: S$GLB,,, | Performed by: OPTOMETRIST

## 2025-04-29 NOTE — PROGRESS NOTES
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 He   has mild bilateral myopia, for which glasses are prescribed for distance   use as needed.  His last exam with me was on 05/09/2024.Today, he reports    that he uses his glasses for distance viewing (TV, driving).  He explains   that he has been using OTC +1.50 glasses when reading paper/ books. Other   than this, he  has not noticed any new or specific ocular or visual   symptoms.     (--)blurred vision  (--)Headaches  (--)diplopia  (--)flashes  (--)floaters  (--)pain  (--)Itching  (--)tearing  (--)burning  (--)Dryness  (--) OTC Drops  (--)Photophobia     Last edited by Sofía Taylor, OD on 4/29/2025 12:23 PM.        For exam results, see encounter report    Assessment /Plan    1. Myopia of both eyes with early presbyopia  - Spec Rx per final Rx below for distance only   Eyeglasses Prescription (4/29/2025)          Sphere Cylinder    Right -1.50 Sphere    Left -1.25 Sphere      Type: SVL    Expiration Date: 4/29/2026          - OK to continue use of OTC +1.50 glasses for near work, as needed      2. Pinguecula of both eyes --> asymptomatic  - Can use OTC artificial teras, as needed, for comfort.      Patient education; RTC in 1 year with DFE; Ok to instill 0.5% Tropicamide after (normal) baseline workup, sooner as needed at [facility]

## 2025-05-02 ENCOUNTER — TELEPHONE (OUTPATIENT)
Dept: CARDIOLOGY | Facility: CLINIC | Age: 50
End: 2025-05-02
Payer: COMMERCIAL

## 2025-05-05 ENCOUNTER — TELEPHONE (OUTPATIENT)
Dept: CARDIOLOGY | Facility: CLINIC | Age: 50
End: 2025-05-05
Payer: COMMERCIAL

## 2025-08-29 ENCOUNTER — OFFICE VISIT (OUTPATIENT)
Dept: CARDIOLOGY | Facility: CLINIC | Age: 50
End: 2025-08-29
Payer: COMMERCIAL

## 2025-08-29 ENCOUNTER — PATIENT MESSAGE (OUTPATIENT)
Dept: CARDIOLOGY | Facility: CLINIC | Age: 50
End: 2025-08-29

## 2025-08-29 DIAGNOSIS — Z13.6 ENCOUNTER FOR SCREENING FOR CARDIOVASCULAR DISORDERS: ICD-10-CM

## 2025-08-29 DIAGNOSIS — Z71.89 ENCOUNTER FOR CARDIAC RISK COUNSELING: Primary | ICD-10-CM

## 2025-09-02 ENCOUNTER — PATIENT MESSAGE (OUTPATIENT)
Dept: CARDIOLOGY | Facility: CLINIC | Age: 50
End: 2025-09-02
Payer: COMMERCIAL